# Patient Record
Sex: FEMALE | Race: WHITE | Employment: FULL TIME | URBAN - METROPOLITAN AREA
[De-identification: names, ages, dates, MRNs, and addresses within clinical notes are randomized per-mention and may not be internally consistent; named-entity substitution may affect disease eponyms.]

---

## 2021-07-10 ENCOUNTER — APPOINTMENT (OUTPATIENT)
Dept: GENERAL RADIOLOGY | Age: 56
DRG: 281 | End: 2021-07-10
Attending: EMERGENCY MEDICINE
Payer: COMMERCIAL

## 2021-07-10 ENCOUNTER — HOSPITAL ENCOUNTER (INPATIENT)
Age: 56
LOS: 3 days | Discharge: HOME | DRG: 281 | End: 2021-07-13
Attending: EMERGENCY MEDICINE | Admitting: INTERNAL MEDICINE
Payer: COMMERCIAL

## 2021-07-10 DIAGNOSIS — I21.4 NON-STEMI (NON-ST ELEVATED MYOCARDIAL INFARCTION) (HCC): ICD-10-CM

## 2021-07-10 DIAGNOSIS — R19.7 DIARRHEA, UNSPECIFIED TYPE: ICD-10-CM

## 2021-07-10 DIAGNOSIS — I21.4 NSTEMI (NON-ST ELEVATED MYOCARDIAL INFARCTION) (HCC): Primary | ICD-10-CM

## 2021-07-10 DIAGNOSIS — E87.1 HYPONATREMIA: ICD-10-CM

## 2021-07-10 LAB
ALBUMIN SERPL-MCNC: 3.9 G/DL (ref 3.5–5)
ALBUMIN/GLOB SERPL: 1.2 {RATIO} (ref 1.1–2.2)
ALP SERPL-CCNC: 50 U/L (ref 45–117)
ALT SERPL-CCNC: 20 U/L (ref 12–78)
ANION GAP SERPL CALC-SCNC: 7 MMOL/L (ref 5–15)
APPEARANCE UR: CLEAR
AST SERPL-CCNC: 19 U/L (ref 15–37)
BACTERIA URNS QL MICRO: NEGATIVE /HPF
BASOPHILS # BLD: 0 K/UL (ref 0–0.1)
BASOPHILS NFR BLD: 1 % (ref 0–1)
BILIRUB SERPL-MCNC: 0.3 MG/DL (ref 0.2–1)
BILIRUB UR QL: NEGATIVE
BUN SERPL-MCNC: 4 MG/DL (ref 6–20)
BUN/CREAT SERPL: 6 (ref 12–20)
CALCIUM SERPL-MCNC: 9.3 MG/DL (ref 8.5–10.1)
CHLORIDE SERPL-SCNC: 96 MMOL/L (ref 97–108)
CO2 SERPL-SCNC: 28 MMOL/L (ref 21–32)
COLOR UR: ABNORMAL
COVID-19 RAPID TEST, COVR: ABNORMAL
COVID-19 RAPID TEST, COVR: NOT DETECTED
CREAT SERPL-MCNC: 0.66 MG/DL (ref 0.55–1.02)
DIFFERENTIAL METHOD BLD: ABNORMAL
EOSINOPHIL # BLD: 0.1 K/UL (ref 0–0.4)
EOSINOPHIL NFR BLD: 2 % (ref 0–7)
EPITH CASTS URNS QL MICRO: ABNORMAL /LPF
ERYTHROCYTE [DISTWIDTH] IN BLOOD BY AUTOMATED COUNT: 11.6 % (ref 11.5–14.5)
GLOBULIN SER CALC-MCNC: 3.2 G/DL (ref 2–4)
GLUCOSE SERPL-MCNC: 92 MG/DL (ref 65–100)
GLUCOSE UR STRIP.AUTO-MCNC: NEGATIVE MG/DL
HCG UR QL: NEGATIVE
HCT VFR BLD AUTO: 34.8 % (ref 35–47)
HGB BLD-MCNC: 11.9 G/DL (ref 11.5–16)
HGB UR QL STRIP: NEGATIVE
HYALINE CASTS URNS QL MICRO: ABNORMAL /LPF (ref 0–5)
IMM GRANULOCYTES # BLD AUTO: 0 K/UL (ref 0–0.04)
IMM GRANULOCYTES NFR BLD AUTO: 0 % (ref 0–0.5)
KETONES UR QL STRIP.AUTO: ABNORMAL MG/DL
LEUKOCYTE ESTERASE UR QL STRIP.AUTO: NEGATIVE
LIPASE SERPL-CCNC: 67 U/L (ref 73–393)
LYMPHOCYTES # BLD: 0.7 K/UL (ref 0.8–3.5)
LYMPHOCYTES NFR BLD: 18 % (ref 12–49)
MCH RBC QN AUTO: 31.6 PG (ref 26–34)
MCHC RBC AUTO-ENTMCNC: 34.2 G/DL (ref 30–36.5)
MCV RBC AUTO: 92.6 FL (ref 80–99)
MONOCYTES # BLD: 0.8 K/UL (ref 0–1)
MONOCYTES NFR BLD: 21 % (ref 5–13)
NEUTS SEG # BLD: 2.4 K/UL (ref 1.8–8)
NEUTS SEG NFR BLD: 58 % (ref 32–75)
NITRITE UR QL STRIP.AUTO: NEGATIVE
NRBC # BLD: 0 K/UL (ref 0–0.01)
NRBC BLD-RTO: 0 PER 100 WBC
PH UR STRIP: 6 [PH] (ref 5–8)
PLATELET # BLD AUTO: 216 K/UL (ref 150–400)
PMV BLD AUTO: 9.6 FL (ref 8.9–12.9)
POTASSIUM SERPL-SCNC: 3.4 MMOL/L (ref 3.5–5.1)
PROT SERPL-MCNC: 7.1 G/DL (ref 6.4–8.2)
PROT UR STRIP-MCNC: NEGATIVE MG/DL
RBC # BLD AUTO: 3.76 M/UL (ref 3.8–5.2)
RBC #/AREA URNS HPF: ABNORMAL /HPF (ref 0–5)
RBC MORPH BLD: ABNORMAL
SODIUM SERPL-SCNC: 131 MMOL/L (ref 136–145)
SOURCE, COVRS: ABNORMAL
SOURCE, COVRS: NORMAL
SP GR UR REFRACTOMETRY: 1.01 (ref 1–1.03)
TROPONIN I SERPL-MCNC: 1.39 NG/ML
TROPONIN I SERPL-MCNC: 2.37 NG/ML
UR CULT HOLD, URHOLD: NORMAL
UROBILINOGEN UR QL STRIP.AUTO: 0.2 EU/DL (ref 0.2–1)
WBC # BLD AUTO: 4 K/UL (ref 3.6–11)
WBC URNS QL MICRO: ABNORMAL /HPF (ref 0–4)

## 2021-07-10 PROCEDURE — 74011000250 HC RX REV CODE- 250: Performed by: INTERNAL MEDICINE

## 2021-07-10 PROCEDURE — C9113 INJ PANTOPRAZOLE SODIUM, VIA: HCPCS | Performed by: INTERNAL MEDICINE

## 2021-07-10 PROCEDURE — 74011250636 HC RX REV CODE- 250/636: Performed by: PHYSICIAN ASSISTANT

## 2021-07-10 PROCEDURE — 96375 TX/PRO/DX INJ NEW DRUG ADDON: CPT

## 2021-07-10 PROCEDURE — 74011250637 HC RX REV CODE- 250/637: Performed by: INTERNAL MEDICINE

## 2021-07-10 PROCEDURE — 93005 ELECTROCARDIOGRAM TRACING: CPT

## 2021-07-10 PROCEDURE — 74011250637 HC RX REV CODE- 250/637: Performed by: NURSE PRACTITIONER

## 2021-07-10 PROCEDURE — 81001 URINALYSIS AUTO W/SCOPE: CPT

## 2021-07-10 PROCEDURE — 96361 HYDRATE IV INFUSION ADD-ON: CPT

## 2021-07-10 PROCEDURE — 65660000001 HC RM ICU INTERMED STEPDOWN

## 2021-07-10 PROCEDURE — 71045 X-RAY EXAM CHEST 1 VIEW: CPT

## 2021-07-10 PROCEDURE — 83690 ASSAY OF LIPASE: CPT

## 2021-07-10 PROCEDURE — 81025 URINE PREGNANCY TEST: CPT

## 2021-07-10 PROCEDURE — 74011250636 HC RX REV CODE- 250/636: Performed by: INTERNAL MEDICINE

## 2021-07-10 PROCEDURE — 96374 THER/PROPH/DIAG INJ IV PUSH: CPT

## 2021-07-10 PROCEDURE — 99285 EMERGENCY DEPT VISIT HI MDM: CPT

## 2021-07-10 PROCEDURE — 36415 COLL VENOUS BLD VENIPUNCTURE: CPT

## 2021-07-10 PROCEDURE — 74011250637 HC RX REV CODE- 250/637: Performed by: PHYSICIAN ASSISTANT

## 2021-07-10 PROCEDURE — 85025 COMPLETE CBC W/AUTO DIFF WBC: CPT

## 2021-07-10 PROCEDURE — 84484 ASSAY OF TROPONIN QUANT: CPT

## 2021-07-10 PROCEDURE — 87635 SARS-COV-2 COVID-19 AMP PRB: CPT

## 2021-07-10 PROCEDURE — 80053 COMPREHEN METABOLIC PANEL: CPT

## 2021-07-10 RX ORDER — ENOXAPARIN SODIUM 100 MG/ML
1 INJECTION SUBCUTANEOUS EVERY 12 HOURS
Status: DISCONTINUED | OUTPATIENT
Start: 2021-07-10 | End: 2021-07-13

## 2021-07-10 RX ORDER — METOPROLOL TARTRATE 25 MG/1
12.5 TABLET, FILM COATED ORAL 2 TIMES DAILY
Status: DISCONTINUED | OUTPATIENT
Start: 2021-07-10 | End: 2021-07-12

## 2021-07-10 RX ORDER — ACETAMINOPHEN 325 MG/1
650 TABLET ORAL
Status: DISCONTINUED | OUTPATIENT
Start: 2021-07-10 | End: 2021-07-13 | Stop reason: HOSPADM

## 2021-07-10 RX ORDER — OXYCODONE HYDROCHLORIDE 5 MG/1
5 TABLET ORAL
Status: DISCONTINUED | OUTPATIENT
Start: 2021-07-10 | End: 2021-07-13 | Stop reason: HOSPADM

## 2021-07-10 RX ORDER — SIMETHICONE 80 MG
80 TABLET,CHEWABLE ORAL
Status: DISCONTINUED | OUTPATIENT
Start: 2021-07-10 | End: 2021-07-13 | Stop reason: HOSPADM

## 2021-07-10 RX ORDER — MORPHINE SULFATE 2 MG/ML
2 INJECTION, SOLUTION INTRAMUSCULAR; INTRAVENOUS
Status: DISCONTINUED | OUTPATIENT
Start: 2021-07-10 | End: 2021-07-13 | Stop reason: HOSPADM

## 2021-07-10 RX ORDER — ONDANSETRON 2 MG/ML
4 INJECTION INTRAMUSCULAR; INTRAVENOUS
Status: DISCONTINUED | OUTPATIENT
Start: 2021-07-10 | End: 2021-07-13 | Stop reason: HOSPADM

## 2021-07-10 RX ORDER — GUAIFENESIN 100 MG/5ML
81 LIQUID (ML) ORAL DAILY
Status: DISCONTINUED | OUTPATIENT
Start: 2021-07-11 | End: 2021-07-13 | Stop reason: HOSPADM

## 2021-07-10 RX ORDER — LANOLIN ALCOHOL/MO/W.PET/CERES
3 CREAM (GRAM) TOPICAL
Status: DISCONTINUED | OUTPATIENT
Start: 2021-07-10 | End: 2021-07-13 | Stop reason: HOSPADM

## 2021-07-10 RX ORDER — GUAIFENESIN 100 MG/5ML
324 LIQUID (ML) ORAL
Status: COMPLETED | OUTPATIENT
Start: 2021-07-10 | End: 2021-07-10

## 2021-07-10 RX ORDER — SODIUM CHLORIDE 0.9 % (FLUSH) 0.9 %
5-40 SYRINGE (ML) INJECTION EVERY 8 HOURS
Status: DISCONTINUED | OUTPATIENT
Start: 2021-07-10 | End: 2021-07-13 | Stop reason: HOSPADM

## 2021-07-10 RX ORDER — KETOROLAC TROMETHAMINE 30 MG/ML
15 INJECTION, SOLUTION INTRAMUSCULAR; INTRAVENOUS
Status: COMPLETED | OUTPATIENT
Start: 2021-07-10 | End: 2021-07-10

## 2021-07-10 RX ORDER — HEPARIN SODIUM 5000 [USP'U]/ML
5000 INJECTION, SOLUTION INTRAVENOUS; SUBCUTANEOUS EVERY 8 HOURS
Status: DISCONTINUED | OUTPATIENT
Start: 2021-07-10 | End: 2021-07-10

## 2021-07-10 RX ORDER — ROSUVASTATIN CALCIUM 40 MG/1
40 TABLET, COATED ORAL
Status: DISCONTINUED | OUTPATIENT
Start: 2021-07-10 | End: 2021-07-12

## 2021-07-10 RX ORDER — ONDANSETRON 2 MG/ML
4 INJECTION INTRAMUSCULAR; INTRAVENOUS
Status: COMPLETED | OUTPATIENT
Start: 2021-07-10 | End: 2021-07-10

## 2021-07-10 RX ORDER — SODIUM CHLORIDE 9 MG/ML
125 INJECTION, SOLUTION INTRAVENOUS CONTINUOUS
Status: DISCONTINUED | OUTPATIENT
Start: 2021-07-10 | End: 2021-07-13

## 2021-07-10 RX ORDER — SODIUM CHLORIDE 0.9 % (FLUSH) 0.9 %
5-40 SYRINGE (ML) INJECTION AS NEEDED
Status: DISCONTINUED | OUTPATIENT
Start: 2021-07-10 | End: 2021-07-13 | Stop reason: HOSPADM

## 2021-07-10 RX ADMIN — SODIUM CHLORIDE 125 ML/HR: 9 INJECTION, SOLUTION INTRAVENOUS at 18:58

## 2021-07-10 RX ADMIN — NITROGLYCERIN 1 INCH: 20 OINTMENT TOPICAL at 19:04

## 2021-07-10 RX ADMIN — SIMETHICONE 80 MG: 80 TABLET, CHEWABLE ORAL at 23:43

## 2021-07-10 RX ADMIN — SODIUM CHLORIDE 125 ML/HR: 9 INJECTION, SOLUTION INTRAVENOUS at 23:42

## 2021-07-10 RX ADMIN — Medication 10 ML: at 19:07

## 2021-07-10 RX ADMIN — ALUMINUM HYDROXIDE, MAGNESIUM HYDROXIDE, AND SIMETHICONE 40 ML: 200; 200; 20 SUSPENSION ORAL at 19:05

## 2021-07-10 RX ADMIN — KETOROLAC TROMETHAMINE 15 MG: 30 INJECTION, SOLUTION INTRAMUSCULAR; INTRAVENOUS at 15:52

## 2021-07-10 RX ADMIN — POTASSIUM BICARBONATE 40 MEQ: 782 TABLET, EFFERVESCENT ORAL at 19:06

## 2021-07-10 RX ADMIN — SODIUM CHLORIDE 40 MG: 9 INJECTION INTRAMUSCULAR; INTRAVENOUS; SUBCUTANEOUS at 18:59

## 2021-07-10 RX ADMIN — ASPIRIN 324 MG: 81 TABLET, CHEWABLE ORAL at 17:15

## 2021-07-10 RX ADMIN — Medication 3 MG: at 23:43

## 2021-07-10 RX ADMIN — ONDANSETRON 4 MG: 2 INJECTION INTRAMUSCULAR; INTRAVENOUS at 15:51

## 2021-07-10 RX ADMIN — SODIUM CHLORIDE 1000 ML: 9 INJECTION, SOLUTION INTRAVENOUS at 15:52

## 2021-07-10 RX ADMIN — Medication 10 ML: at 23:44

## 2021-07-10 RX ADMIN — ENOXAPARIN SODIUM 70 MG: 80 INJECTION SUBCUTANEOUS at 20:53

## 2021-07-10 RX ADMIN — ROSUVASTATIN 40 MG: 40 TABLET, FILM COATED ORAL at 23:43

## 2021-07-10 RX ADMIN — HEPARIN SODIUM 5000 UNITS: 5000 INJECTION INTRAVENOUS; SUBCUTANEOUS at 19:01

## 2021-07-10 NOTE — Clinical Note
Right groin and right radial clipped prepped with ChloraPrep and draped. cashew allergy/food ingestion/known exposure (describe)

## 2021-07-10 NOTE — ED NOTES
Patient taken back to room 25 and placed on monitor times 3. Patient reports 3 days of diarrhea and chest pressure that started this morning and lasted about a an hour.  Patient describes the pain as if \"someone was sitting on my chest\"

## 2021-07-10 NOTE — ED NOTES
Bedside and Verbal shift change report given to Germain Sanchez and Dawn RUST 72. (oncoming nurse) by Nelly Up (offgoing nurse). Report included the following information SBAR, Kardex, ED Summary, [facility] and Recent Results.

## 2021-07-10 NOTE — H&P
Hospitalist History and Physical  Dayron Ravi MD  Answering service: [phone] from in house phone        Date of Service:  7/10/2021  NAME:  Chelsea Michel  :  1965  MRN:  [id number]  Primary Care Provider: None    Chief Complaint:   Chief Complaint   Patient presents with    Diarrhea    Chest Pain       History of Present Illness:     Chelsea Michel is a 49 y.o. female with no significant past medical history comes with complaints of chest pain and pressure along with diarrhea. Patient is awake, alert and oriented able to answer my question follow my request.  Data also obtained of the ED staff and extensive chart review. As per collective reports patient is a healthy lady who does not take any medications except for glucosamine and probiotics daily. Patient reports that she recently traveled back from [place] where she was a for vacation about a week ago and has been doing well but she developed diarrhea about 3 days ago. Diarrhea is nonbloody, nonmucoid, watery yellowish color diarrhea which happens in episodes and these episodes are associated with crampy abdominal pain. She also has lost appetite and has had poor oral intake in the last day and a half. Patient did not have any nausea or vomiting until this morning where she had nausea without any vomiting. She she also developed chest pains this morning. The pain she describes is as of chest pressure, starting since 10th a.m. along with slight dizziness and burning in the chest.  She feels as if her heartburn has gotten worse. She also reports that whenever she eats chest passes right through her but she only had had 24 BM this morning. She had a leftover [place] dinner on 2021. She also had fish tacos and lunch that day. She had no other sick contacts, no antibiotic use, no cough, fevers chills or rigors.   The chest pressure she described was as if somebody sitting on her chest and it lasted till 4 PM in the ED. In the ED she was normotensive, hemodynamically stable but was dizzy so received a fluid bolus of 1 L which improved her symptoms significantly. Blood work revealed hypokalemia, hyponatremia and elevated troponin at 1.39. EKG was negative for acute pathology. She received aspirin and cardiology was consulted. Cardiology recommended to get an echocardiogram and a Covid testing given diarrhea. We will admit the patient for further evaluation and management. Chart reviewed at length. Review of Systems:  Pertinent positives noted in HPI. All other systems were reviewed and are negative. Past Medical and surgical history:   Past Medical History:   Diagnosis Date    Cervical spondylosis with radiculopathy       Past Surgical History:   Procedure Laterality Date    HX GYN  2001    cervical conization        Home medications:  Patient takes glucosamine and probiotics at home    Allergies:  No Known Allergies    Family history:   History reviewed. No pertinent family history. SOCIAL HISTORY:  Patient resides at Home. Patient ambulates with out device. Smoking history: reports that she has been smoking. She has never used smokeless tobacco.  Drug History:  reports no history of drug use. Alcohol history:  reports current alcohol use.     Objective:       Physical Exam:   Visit Vitals  /84   Pulse 64   Temp 99 °F (37.2 °C)   Resp 15   Ht 5' 8.5\" (1.74 m)   Wt 68.9 kg (152 lb)   SpO2 100%   BMI 22.78 kg/m²     General appearance: alert, cooperative, no distress, appears stated age  Head: Normocephalic, without obvious abnormality, atraumatic  Eyes: negative findings: anicteric sclera  Throat: Oral mucosa is dry  Lungs: Clear to auscultation bilaterally, no wheezing rales or rhonchi  Heart: RRR, no murmur gallops or rubs  Abdomen: Soft, diffusely tender, with mild positive midepigastric tenderness, bowel sounds normoactive  Extremities: extremities normal, atraumatic, no cyanosis or edema  Pulses: 2+ and symmetric  Skin: Skin color, texture, turgor normal. No rashes or lesions  Neurologic: Grossly normal    ECG: Normal sinus rhythm    Laboratory and other diagnostic Data Review: All diagnostic labs and studies have been reviewed. No results found. Patient Vitals for the past 12 hrs:   Temp Pulse Resp BP SpO2   07/10/21 1708    122/84    07/10/21 1311 99 °F (37.2 °C) 64 15 113/73 100 %       Recent Labs     07/10/21  1542   WBC 4.0   HGB 11.9   HCT 34.8*        Recent Labs     07/10/21  1542   *   K 3.4*   CL 96*   CO2 28   BUN 4*   CREA 0.66   GLU 92   CA 9.3     Recent Labs     07/10/21  1542   ALT 20   AP 50   TBILI 0.3   TP 7.1   ALB 3.9   GLOB 3.2   LPSE 67*     No results for input(s): INR, PTP, APTT, INREXT in the last 72 hours. No results for input(s): FE, TIBC, PSAT, FERR in the last 72 hours. No results found for: FOL, RBCF   No results for input(s): PH, PCO2, PO2 in the last 72 hours.   Recent Labs     07/10/21  1542   TROIQ 1.39*     No results found for: CHOL, CHOLX, CHLST, CHOLV, HDL, HDLP, LDL, LDLC, DLDLP, TGLX, TRIGL, TRIGP, CHHD, CHHDX  No results found for: [facility]  Lab Results   Component Value Date/Time    Color YELLOW/STRAW 07/10/2021 03:42 PM    Appearance CLEAR 07/10/2021 03:42 PM    Specific gravity 1.007 07/10/2021 03:42 PM    pH (UA) 6.0 07/10/2021 03:42 PM    Protein Negative 07/10/2021 03:42 PM    Glucose Negative 07/10/2021 03:42 PM    Ketone TRACE (A) 07/10/2021 03:42 PM    Bilirubin Negative 07/10/2021 03:42 PM    Urobilinogen 0.2 07/10/2021 03:42 PM    Nitrites Negative 07/10/2021 03:42 PM    Leukocyte Esterase Negative 07/10/2021 03:42 PM    Epithelial cells FEW 07/10/2021 03:42 PM    Bacteria Negative 07/10/2021 03:42 PM    WBC 0-4 07/10/2021 03:42 PM    RBC 0-5 07/10/2021 03:42 PM       Assessment:   Given the patient's current clinical presentation, I have a high level of concern for decompensation if discharged from the emergency department. Complex decision making was performed, which includes reviewing the patient's available past medical records, laboratory results, and x-ray films. My assessment of this patient's clinical condition and my plan of care is as follows. Active Problems:    NSTEMI (non-ST elevated myocardial infarction) ([facility] [address] (7/10/2021)        Plan:     NSTEMI, POA  Admit to telemetry  Patient complains of chest pressure and retro-sternal burning (?  GERD ) with associated dizziness, without shortness of breath, palpitations, orthopnea or dysphoria  Troponin x1 at 1.39, received aspirin 325 mg p.o. x1  Continue aspirin  Holding off of metoprolol due to relative bradycardia  Check repeat troponin x1 now, if trending up, will place her on a heparin drippatient is in agreement  Continue to trend troponin every 6 as needed, EKG. Chest pain  EKG admission shows normal sinus rhythm  Cardiology consulted the ED, recommend echocardiogram and Covid test given diarrheawill await final consultation  IV fluids  N.p.o. at midnight  Nitro-Bid paste  GI cocktail and IV Protonix for upper GI symptoms  Patient is in agreement with this plan    Diarrhea, POA  The diarrhea is nonbloody, nonmucoid without any vomiting. Nausea is positive only today  Zofran IV as needed nausea  IV fluids  Cover testing is being done, if diarrhea persists or if she has any signs symptoms of infection, will recommend getting a stool for ova and parasites and stool culture. Hyponatremia and hypokalemia due to GI losses.   Replete and repeat lab in the morning  IV fluids      Diet: Regular Diet  Activity: Out of bed to chair, fall precautions  DVT prophylaxis: Subcu heparin  Isolation precautions: None  Consultations: Cardiology  Anticipated disposition: Home with family  Code status: Full Code    Admit to inpatient status      Patient was explained about the risk of admission including and not a complete list including risk of falls,fractures,blood clots,allergic reactions,infections. Patient/family also understands and agrees to the treatment plan including medications and side effect profiles and also understand the risk with radiation while undergoing imaging studies. The patient and the family/friends (after permission given by the patient to discuss) understand this and agree with the admission plan. Signed By: Cruz Cruz MD     07/10/21  6:08 PM        Patient's emergency contacts:  Extended Emergency Contact Information  Primary Emergency Contact: [address] Phone: [phone]  Relation: Mother     Please note that this dictation was completed with [employer], the [employer] voice recognition software. Quite often unanticipated grammatical, syntax, homophones, and other interpretive errors are inadvertently transcribed by the computer software. Please disregard these errors. Please excuse any errors that have escaped final proofreading.

## 2021-07-10 NOTE — ED PROVIDER NOTES
61-year-old female with no significant past medical history presents ambulatory for evaluation of abdominal cramping and diarrhea for the past 3 days. She states since 10am today she has felt lightheaded and pressure in her chest improving on arrival.  She states she was having 6-8 yellow bowel movements a day, states \"whenever I eat anything it goes right through me. \"  She states today she has only had 4 bowel movement but admits to decreased appetite. She states she had an episode of diarrhea that lasted 48 hours in early June and resolved spontaneously and has not had recurrence until 7/7. She states she ate a fish taco for lunch the day before and had leftover [place] dinner that night. She denies antibiotic use in the past 2 months, denies sick contacts. No sick contacts, no recent travel. States at 10am she had chest pressure that was non-radiating, \"felt like someone was sitting on my chest\" that lasted from 10am until arrival to the ER in the waiting room. At time of exam CP is minimal.   Saw cardiologist 10 years ago for palpitations with holter monitor placed and no findings at that time and it resolved. Surgical history-no abdominal surgeries  Social history- drinks alcohol 2x a week, smokes 2x a week; was on vacation 1.5 weeks ao and drank and smoked daily           Past Medical History:   Diagnosis Date    Cervical spondylosis with radiculopathy        Past Surgical History:   Procedure Laterality Date    HX GYN  2001    cervical conization          History reviewed. No pertinent family history. Social History     Socioeconomic History    Marital status:      Spouse name: Not on file    Number of children: Not on file    Years of education: Not on file    Highest education level: Not on file   Occupational History    Not on file   Tobacco Use    Smoking status: Current Some Day Smoker    Smokeless tobacco: Never Used   Substance and Sexual Activity    Alcohol use:  Yes    Drug use: Never    Sexual activity: Not on file   Other Topics Concern    Not on file   Social History Narrative    Not on file     Social Determinants of Health     Financial Resource Strain:     Difficulty of Paying Living Expenses:    Food Insecurity:     Worried About Running Out of Food in the Last Year:     [address] in the Last Year:    Transportation Needs:     Lack of Transportation (Medical):  Lack of Transportation (Non-Medical):    Physical Activity:     Days of Exercise per Week:     Minutes of Exercise per Session:    Stress:     Feeling of Stress :    Social Connections:     Frequency of Communication with Friends and Family:     Frequency of Social Gatherings with Friends and Family:     Attends Faith Services:     Active Member of Clubs or Organizations:     Attends Club or Organization Meetings:     Marital Status:    Intimate Partner Violence:     Fear of Current or Ex-Partner:     Emotionally Abused:     Physically Abused:     Sexually Abused: ALLERGIES: Patient has no known allergies. Review of Systems   Constitutional: Positive for appetite change. Negative for activity change, chills, fatigue and unexpected weight change. HENT: Negative for trouble swallowing. Respiratory: Negative for cough, chest tightness, shortness of breath and wheezing. Cardiovascular: Negative. Negative for palpitations. Chest pressure   Gastrointestinal: Positive for abdominal pain, diarrhea and nausea. Negative for vomiting. Genitourinary: Negative. Negative for dysuria, flank pain, frequency and hematuria. Musculoskeletal: Negative. Negative for arthralgias, back pain, neck pain and neck stiffness. Skin: Negative. Negative for color change and rash. Neurological: Negative. Negative for dizziness, numbness and headaches. All other systems reviewed and are negative.       Vitals:    07/10/21 1311   BP: 113/73   Pulse: 64   Resp: 15   Temp: 99 °F (37.2 °C)   SpO2: 100%   Weight: 68.9 kg (152 lb)   Height: 5' 8.5\" (1.74 m)            Physical Exam  Vitals and nursing note reviewed. Constitutional:       General: She is not in acute distress. Appearance: She is well-developed. She is not toxic-appearing or diaphoretic. Comments: WF   HENT:      Head: Normocephalic and atraumatic. Eyes:      General:         Right eye: No discharge. Left eye: No discharge. Conjunctiva/sclera: Conjunctivae normal.      Pupils: Pupils are equal, round, and reactive to light. Neck:      Trachea: No tracheal tenderness. Cardiovascular:      Rate and Rhythm: Normal rate and regular rhythm. Pulses: Normal pulses. Heart sounds: Normal heart sounds. No murmur heard. No friction rub. No gallop. Pulmonary:      Effort: Pulmonary effort is normal. No respiratory distress. Breath sounds: Normal breath sounds. No wheezing or rales. Chest:      Chest wall: No tenderness. Abdominal:      General: Bowel sounds are normal. There is no distension. Palpations: Abdomen is soft. Tenderness: There is no abdominal tenderness. There is no guarding or rebound. Comments: Mild generalized discomfort; no peritoneal signs. Musculoskeletal:         General: No tenderness. Normal range of motion. Cervical back: Full passive range of motion without pain and normal range of motion. Skin:     General: Skin is warm and dry. Capillary Refill: Capillary refill takes less than 2 seconds. Findings: No abrasion, erythema or rash. Neurological:      Mental Status: She is alert and oriented to person, place, and time. Cranial Nerves: No cranial nerve deficit. Sensory: No sensory deficit.       Coordination: Coordination normal.   Psychiatric:         Speech: Speech normal.         Behavior: Behavior normal.          MDM  Number of Diagnoses or Management Options  Diagnosis management comments:   Ddx: Gastroenteritis, food poisoning, colitis, dehydration, ACS, electrolyte abnormality, UTI, pregnancy       Amount and/or Complexity of Data Reviewed  Clinical lab tests: ordered and reviewed  Tests in the radiology section of CPT®: ordered and reviewed  Review and summarize past medical records: yes    Patient Progress  Patient progress: stable    ED Course as of Jul 10 1710   Sat Jul 10, 2021   1626 ED EKG interpretation:  Rhythm: normal sinus rhythm; and regular . Rate (approx.): 62; Axis: normal; ST/T wave: normal; No evidence of acute coronary ischemia. [AL]      ED Course User Index  [AL] Yun Mcnamara MD       Procedures    I discussed patient's PMH, exam findings as well as careplan with the ER attending who agrees with care plan. Kimmie Momin PA-C    EKG interpretation:   Rhythm: normal sinus rhythm; and regular . Rate (approx.): 62; Axis: normal; P wave: normal; QRS interval: normal ; ST/T wave: normal;       Perfect Serve Consult for Admission  5:16 PM    ED Room Number: ER25/25  Patient Name and age:  Tamiko Hines 49 y.o.  female  Working Diagnosis:   1. NSTEMI (non-ST elevated myocardial infarction) ([facility] [address]    2. Hyponatremia    3. Diarrhea, unspecified type        COVID-19 Suspicion:  no  Sepsis present:  no  Reassessment needed: no  Code Status:  Full Code  Readmission: no  Isolation Requirements:  no  Recommended Level of Care:  telemetry  Department:[facility] Adult ED - 21   Other:  No PMH. + smoker and 2x/week drinker. Diarrhea x 3 days. Chest pressure, nausea x 10am until ER arrival, resolved in waiting area. EKG shows NSR. Troponin 1.39. Creat normal at 0.66. No Na and Cl. Fluid bolus, Toradol initially given. ASA ordered when Troponin resulted. Has been pain-free since ~4pm.      CONSULT NOTE:   5:40 PM  Kimmie Momin PA-C spoke with Dr. Carlene Lopez,   Specialty: cardiology  Discussed pt's hx, disposition, and available diagnostic and imaging results. Reviewed care plans.  Consultant agrees with plans as outlined. Dr. Gregg Mcallister recommends COVID test due to diarrhea symptoms with chest pain and asks if you can order an echo for the morning. He said he will look at her chart but had no further recommendations at this time. .   Written by Danny Boss PA-C.    5:41pm  I notified hospitalist of cardiology recommendations.   Danny Boss PA-C

## 2021-07-10 NOTE — PROGRESS NOTES
Chart reviewed, severe diarrhea some chest discomfort, reflux, loss of appetite, EKG normal, mild troponin elevation,echo ordered.

## 2021-07-11 ENCOUNTER — APPOINTMENT (OUTPATIENT)
Dept: ULTRASOUND IMAGING | Age: 56
DRG: 281 | End: 2021-07-11
Attending: INTERNAL MEDICINE
Payer: COMMERCIAL

## 2021-07-11 ENCOUNTER — APPOINTMENT (OUTPATIENT)
Dept: CT IMAGING | Age: 56
DRG: 281 | End: 2021-07-11
Attending: INTERNAL MEDICINE
Payer: COMMERCIAL

## 2021-07-11 ENCOUNTER — APPOINTMENT (OUTPATIENT)
Dept: VASCULAR SURGERY | Age: 56
DRG: 281 | End: 2021-07-11
Attending: INTERNAL MEDICINE
Payer: COMMERCIAL

## 2021-07-11 ENCOUNTER — APPOINTMENT (OUTPATIENT)
Dept: NON INVASIVE DIAGNOSTICS | Age: 56
DRG: 281 | End: 2021-07-11
Attending: SPECIALIST
Payer: COMMERCIAL

## 2021-07-11 PROBLEM — R19.7 DIARRHEA OF PRESUMED INFECTIOUS ORIGIN: Status: ACTIVE | Noted: 2021-07-11

## 2021-07-11 LAB
ALBUMIN SERPL-MCNC: 2.8 G/DL (ref 3.5–5)
ALBUMIN/GLOB SERPL: 1.1 {RATIO} (ref 1.1–2.2)
ALP SERPL-CCNC: 36 U/L (ref 45–117)
ALT SERPL-CCNC: 17 U/L (ref 12–78)
AMPHET UR QL SCN: NEGATIVE
ANION GAP SERPL CALC-SCNC: 4 MMOL/L (ref 5–15)
AST SERPL-CCNC: 21 U/L (ref 15–37)
AV R PG: 23.35 MMHG
BARBITURATES UR QL SCN: NEGATIVE
BASOPHILS # BLD: 0 K/UL (ref 0–0.1)
BASOPHILS NFR BLD: 1 % (ref 0–1)
BENZODIAZ UR QL: NEGATIVE
BILIRUB SERPL-MCNC: 0.2 MG/DL (ref 0.2–1)
BUN SERPL-MCNC: 5 MG/DL (ref 6–20)
BUN/CREAT SERPL: 10 (ref 12–20)
CALCIUM SERPL-MCNC: 7.5 MG/DL (ref 8.5–10.1)
CANNABINOIDS UR QL SCN: NEGATIVE
CHLORIDE SERPL-SCNC: 103 MMOL/L (ref 97–108)
CHOLEST SERPL-MCNC: 121 MG/DL
CO2 SERPL-SCNC: 26 MMOL/L (ref 21–32)
COCAINE UR QL SCN: NEGATIVE
COMMENT, HOLDF: NORMAL
CREAT SERPL-MCNC: 0.52 MG/DL (ref 0.55–1.02)
CREAT UR-MCNC: <13 MG/DL
CREAT UR-MCNC: <13 MG/DL
D DIMER PPP FEU-MCNC: 0.36 MG/L FEU (ref 0–0.65)
DIFFERENTIAL METHOD BLD: ABNORMAL
DRUG SCRN COMMENT,DRGCM: NORMAL
ECHO AO ROOT DIAM: 2.97 CM
ECHO AR MAX VEL PISA: 241.61 CM/S
ECHO AV PEAK GRADIENT: 8.01 MMHG
ECHO AV PEAK VELOCITY: 141.5 CM/S
ECHO AV REGURGITANT PHT: 5479.9 MS
ECHO EST RA PRESSURE: 3 MMHG
ECHO LA AREA 4C: 13.63 CM2
ECHO LA MAJOR AXIS: 3.95 CM
ECHO LA MINOR AXIS: 2.12 CM
ECHO LA VOL 2C: 51.77 ML (ref 22–52)
ECHO LA VOL 4C: 31.25 ML (ref 22–52)
ECHO LA VOL BP: 45.56 ML (ref 22–52)
ECHO LA VOL/BSA BIPLANE: 24.49 ML/M2 (ref 16–28)
ECHO LA VOLUME INDEX A2C: 27.83 ML/M2 (ref 16–28)
ECHO LA VOLUME INDEX A4C: 16.8 ML/M2 (ref 16–28)
ECHO LV E' LATERAL VELOCITY: 12.16 CM/S
ECHO LV E' SEPTAL VELOCITY: 8.49 CM/S
ECHO LV EDV A2C: 80.39 ML
ECHO LV EDV A4C: 84.46 ML
ECHO LV EDV BP: 82.64 ML (ref 56–104)
ECHO LV EDV INDEX A4C: 45.4 ML/M2
ECHO LV EDV INDEX BP: 44.4 ML/M2
ECHO LV EDV NDEX A2C: 43.2 ML/M2
ECHO LV EJECTION FRACTION A2C: 67 PERCENT
ECHO LV EJECTION FRACTION A4C: 66 PERCENT
ECHO LV EJECTION FRACTION BIPLANE: 66.1 PERCENT (ref 55–100)
ECHO LV ESV A2C: 26.37 ML
ECHO LV ESV A4C: 28.78 ML
ECHO LV ESV BP: 27.98 ML (ref 19–49)
ECHO LV ESV INDEX A2C: 14.2 ML/M2
ECHO LV ESV INDEX A4C: 15.5 ML/M2
ECHO LV ESV INDEX BP: 15 ML/M2
ECHO LV INTERNAL DIMENSION DIASTOLIC: 5.17 CM (ref 3.9–5.3)
ECHO LV INTERNAL DIMENSION SYSTOLIC: 3.32 CM
ECHO LV IVSD: 0.83 CM (ref 0.6–0.9)
ECHO LV MASS 2D: 156.6 G (ref 67–162)
ECHO LV MASS INDEX 2D: 84.2 G/M2 (ref 43–95)
ECHO LV POSTERIOR WALL DIASTOLIC: 0.88 CM (ref 0.6–0.9)
ECHO LVOT DIAM: 1.9 CM
ECHO MV A VELOCITY: 62.33 CM/S
ECHO MV AREA PHT: 3.92 CM2
ECHO MV E DECELERATION TIME (DT): 193.37 MS
ECHO MV E VELOCITY: 92.78 CM/S
ECHO MV E/A RATIO: 1.49
ECHO MV E/E' LATERAL: 7.63
ECHO MV E/E' RATIO (AVERAGED): 9.28
ECHO MV E/E' SEPTAL: 10.93
ECHO MV PRESSURE HALF TIME (PHT): 56.08 MS
ECHO PV MAX VELOCITY: 80.71 CM/S
ECHO PV PEAK INSTANTANEOUS GRADIENT SYSTOLIC: 2.61 MMHG
ECHO PV REGURGITANT MAX VELOCITY: 97.16 CM/S
ECHO RIGHT VENTRICULAR SYSTOLIC PRESSURE (RVSP): 22.86 MMHG
ECHO RV INTERNAL DIMENSION: 3.27 CM
ECHO RV TAPSE: 2.23 CM (ref 1.5–2)
ECHO TV REGURGITANT MAX VELOCITY: 162.65 CM/S
ECHO TV REGURGITANT MAX VELOCITY: 222.81 CM/S
ECHO TV REGURGITANT PEAK GRADIENT: 19.86 MMHG
EOSINOPHIL # BLD: 0.1 K/UL (ref 0–0.4)
EOSINOPHIL NFR BLD: 2 % (ref 0–7)
ERYTHROCYTE [DISTWIDTH] IN BLOOD BY AUTOMATED COUNT: 11.3 % (ref 11.5–14.5)
ERYTHROCYTE [DISTWIDTH] IN BLOOD BY AUTOMATED COUNT: 11.5 % (ref 11.5–14.5)
GLOBULIN SER CALC-MCNC: 2.5 G/DL (ref 2–4)
GLUCOSE SERPL-MCNC: 104 MG/DL (ref 65–100)
HCT VFR BLD AUTO: 26.5 % (ref 35–47)
HCT VFR BLD AUTO: 30.4 % (ref 35–47)
HDLC SERPL-MCNC: 42 MG/DL
HDLC SERPL: 2.9 {RATIO} (ref 0–5)
HGB BLD-MCNC: 10.3 G/DL (ref 11.5–16)
HGB BLD-MCNC: 9 G/DL (ref 11.5–16)
IMM GRANULOCYTES # BLD AUTO: 0 K/UL (ref 0–0.04)
IMM GRANULOCYTES NFR BLD AUTO: 0 % (ref 0–0.5)
LDLC SERPL CALC-MCNC: 60.6 MG/DL (ref 0–100)
LYMPHOCYTES # BLD: 0.9 K/UL (ref 0.8–3.5)
LYMPHOCYTES NFR BLD: 30 % (ref 12–49)
MAGNESIUM SERPL-MCNC: 1.7 MG/DL (ref 1.6–2.4)
MCH RBC QN AUTO: 31.1 PG (ref 26–34)
MCH RBC QN AUTO: 31.6 PG (ref 26–34)
MCHC RBC AUTO-ENTMCNC: 33.9 G/DL (ref 30–36.5)
MCHC RBC AUTO-ENTMCNC: 34 G/DL (ref 30–36.5)
MCV RBC AUTO: 91.7 FL (ref 80–99)
MCV RBC AUTO: 93.3 FL (ref 80–99)
METHADONE UR QL: NEGATIVE
MICROALBUMIN UR-MCNC: <0.5 MG/DL
MICROALBUMIN/CREAT UR-RTO: NORMAL MG/G (ref 0–30)
MONOCYTES # BLD: 0.6 K/UL (ref 0–1)
MONOCYTES NFR BLD: 22 % (ref 5–13)
NEUTS SEG # BLD: 1.3 K/UL (ref 1.8–8)
NEUTS SEG NFR BLD: 45 % (ref 32–75)
NRBC # BLD: 0 K/UL (ref 0–0.01)
NRBC # BLD: 0 K/UL (ref 0–0.01)
NRBC BLD-RTO: 0 PER 100 WBC
NRBC BLD-RTO: 0 PER 100 WBC
OPIATES UR QL: NEGATIVE
OSMOLALITY UR: 119 MOSM/KG H2O
PCP UR QL: NEGATIVE
PLATELET # BLD AUTO: 150 K/UL (ref 150–400)
PLATELET # BLD AUTO: 167 K/UL (ref 150–400)
PMV BLD AUTO: 10 FL (ref 8.9–12.9)
PMV BLD AUTO: 10.1 FL (ref 8.9–12.9)
POTASSIUM SERPL-SCNC: 3.1 MMOL/L (ref 3.5–5.1)
POTASSIUM UR-SCNC: 2 MMOL/L
PROT SERPL-MCNC: 5.3 G/DL (ref 6.4–8.2)
RBC # BLD AUTO: 2.89 M/UL (ref 3.8–5.2)
RBC # BLD AUTO: 3.26 M/UL (ref 3.8–5.2)
RBC MORPH BLD: ABNORMAL
SAMPLES BEING HELD,HOLD: NORMAL
SODIUM SERPL-SCNC: 133 MMOL/L (ref 136–145)
SODIUM UR-SCNC: 25 MMOL/L
TRIGL SERPL-MCNC: 92 MG/DL (ref ?–150)
TROPONIN I SERPL-MCNC: 2.69 NG/ML
TROPONIN I SERPL-MCNC: 6.1 NG/ML
VLDLC SERPL CALC-MCNC: 18.4 MG/DL
WBC # BLD AUTO: 2.5 K/UL (ref 3.6–11)
WBC # BLD AUTO: 2.9 K/UL (ref 3.6–11)

## 2021-07-11 PROCEDURE — 80053 COMPREHEN METABOLIC PANEL: CPT

## 2021-07-11 PROCEDURE — 80061 LIPID PANEL: CPT

## 2021-07-11 PROCEDURE — 85025 COMPLETE CBC W/AUTO DIFF WBC: CPT

## 2021-07-11 PROCEDURE — 71275 CT ANGIOGRAPHY CHEST: CPT

## 2021-07-11 PROCEDURE — 82570 ASSAY OF URINE CREATININE: CPT

## 2021-07-11 PROCEDURE — 74011250636 HC RX REV CODE- 250/636: Performed by: INTERNAL MEDICINE

## 2021-07-11 PROCEDURE — 85027 COMPLETE CBC AUTOMATED: CPT

## 2021-07-11 PROCEDURE — 84484 ASSAY OF TROPONIN QUANT: CPT

## 2021-07-11 PROCEDURE — 85379 FIBRIN DEGRADATION QUANT: CPT

## 2021-07-11 PROCEDURE — 93005 ELECTROCARDIOGRAM TRACING: CPT

## 2021-07-11 PROCEDURE — 93970 EXTREMITY STUDY: CPT

## 2021-07-11 PROCEDURE — 74011250637 HC RX REV CODE- 250/637: Performed by: INTERNAL MEDICINE

## 2021-07-11 PROCEDURE — 80307 DRUG TEST PRSMV CHEM ANLYZR: CPT

## 2021-07-11 PROCEDURE — 93306 TTE W/DOPPLER COMPLETE: CPT | Performed by: SPECIALIST

## 2021-07-11 PROCEDURE — 83735 ASSAY OF MAGNESIUM: CPT

## 2021-07-11 PROCEDURE — C9113 INJ PANTOPRAZOLE SODIUM, VIA: HCPCS | Performed by: INTERNAL MEDICINE

## 2021-07-11 PROCEDURE — 84300 ASSAY OF URINE SODIUM: CPT

## 2021-07-11 PROCEDURE — 74177 CT ABD & PELVIS W/CONTRAST: CPT

## 2021-07-11 PROCEDURE — 93306 TTE W/DOPPLER COMPLETE: CPT

## 2021-07-11 PROCEDURE — 99255 IP/OBS CONSLTJ NEW/EST HI 80: CPT | Performed by: SPECIALIST

## 2021-07-11 PROCEDURE — 74011000636 HC RX REV CODE- 636: Performed by: RADIOLOGY

## 2021-07-11 PROCEDURE — 84133 ASSAY OF URINE POTASSIUM: CPT

## 2021-07-11 PROCEDURE — 0100U RESPIRATORY PANEL,PCR,NASOPHARYNGEAL: CPT

## 2021-07-11 PROCEDURE — 74011000250 HC RX REV CODE- 250: Performed by: INTERNAL MEDICINE

## 2021-07-11 PROCEDURE — 65660000000 HC RM CCU STEPDOWN

## 2021-07-11 PROCEDURE — 36415 COLL VENOUS BLD VENIPUNCTURE: CPT

## 2021-07-11 PROCEDURE — 83935 ASSAY OF URINE OSMOLALITY: CPT

## 2021-07-11 PROCEDURE — 76705 ECHO EXAM OF ABDOMEN: CPT

## 2021-07-11 RX ADMIN — MORPHINE SULFATE 2 MG: 2 INJECTION, SOLUTION INTRAMUSCULAR; INTRAVENOUS at 22:59

## 2021-07-11 RX ADMIN — ENOXAPARIN SODIUM 70 MG: 80 INJECTION SUBCUTANEOUS at 20:51

## 2021-07-11 RX ADMIN — SODIUM CHLORIDE 40 MG: 9 INJECTION INTRAMUSCULAR; INTRAVENOUS; SUBCUTANEOUS at 09:32

## 2021-07-11 RX ADMIN — ENOXAPARIN SODIUM 70 MG: 80 INJECTION SUBCUTANEOUS at 07:10

## 2021-07-11 RX ADMIN — ONDANSETRON 4 MG: 2 INJECTION INTRAMUSCULAR; INTRAVENOUS at 18:07

## 2021-07-11 RX ADMIN — ONDANSETRON 4 MG: 2 INJECTION INTRAMUSCULAR; INTRAVENOUS at 03:26

## 2021-07-11 RX ADMIN — SODIUM CHLORIDE 125 ML/HR: 9 INJECTION, SOLUTION INTRAVENOUS at 09:35

## 2021-07-11 RX ADMIN — Medication 10 ML: at 06:00

## 2021-07-11 RX ADMIN — MORPHINE SULFATE 2 MG: 2 INJECTION, SOLUTION INTRAMUSCULAR; INTRAVENOUS at 05:24

## 2021-07-11 RX ADMIN — OXYCODONE 5 MG: 5 TABLET ORAL at 03:18

## 2021-07-11 RX ADMIN — IOPAMIDOL 100 ML: 755 INJECTION, SOLUTION INTRAVENOUS at 14:39

## 2021-07-11 RX ADMIN — Medication 10 ML: at 20:51

## 2021-07-11 RX ADMIN — MORPHINE SULFATE 2 MG: 2 INJECTION, SOLUTION INTRAMUSCULAR; INTRAVENOUS at 18:36

## 2021-07-11 RX ADMIN — ASPIRIN 81 MG: 81 TABLET, CHEWABLE ORAL at 09:31

## 2021-07-11 RX ADMIN — ROSUVASTATIN 40 MG: 40 TABLET, FILM COATED ORAL at 20:50

## 2021-07-11 NOTE — PROGRESS NOTES
2300  TRANSFER - IN REPORT:    Verbal report received from Esther(name) on Adis Jimenez  being received from ED(unit) for routine progression of care      Report consisted of patients Situation, Background, Assessment and   Recommendations(SBAR). Information from the following report(s) Intake/Output, MAR, Accordion, Recent Results and Cardiac Rhythm NSR was reviewed with the receiving nurse. Opportunity for questions and clarification was provided. Assessment completed upon patients arrival to unit and care assumed. 0240  Pt called me to tell her about new onset of symptoms. She is complaining of pain running from the RT side of her face running down her LT arm. She explains that it comes in waves. She is also having headaches and is incredibly thirsty. 0730  Bedside shift change report given to Stiven Nascimento (oncoming nurse) by Daphne Fitzpatrick (offgoing nurse). Report included the following information SBAR, Kardex, Intake/Output, MAR, Accordion, Recent Results and Cardiac Rhythm NSR.

## 2021-07-11 NOTE — PROGRESS NOTES
1438: Notified Dr. Arnie Wagner of increase of troponin from 2.69 to 6. 10. MD verbalized understanding. No new orders received at this time.

## 2021-07-11 NOTE — ROUTINE PROCESS
.. TRANSFER - OUT REPORT:    Verbal report given to Thalia Stallings RN(name) on Shreyas Baumann  being transferred to [facility](unit) for routine progression of care       Report consisted of patients Situation, Background, Assessment and   Recommendations(SBAR). Information from the following report(s) SBAR, ED Summary, Recent Results and Cardiac Rhythm nsr was reviewed with the receiving nurse. Lines:   Peripheral IV 07/10/21 Left Antecubital (Active)   Site Assessment Clean, dry, & intact 07/10/21 1543   Phlebitis Assessment 0 07/10/21 1543   Infiltration Assessment 0 07/10/21 1543   Dressing Status Clean, dry, & intact 07/10/21 1543   Dressing Type Transparent 07/10/21 1543        Opportunity for questions and clarification was provided.       Patient transported with:   Registered Nurse

## 2021-07-11 NOTE — CONSULTS
Consult Note      Assessment:    Patient Active Problem List   Diagnosis Code    [facility]ST elevated [facility]) [facility] I[facility]    Diarrhea of presumed infectious origin R19.7       Recommendations:    stress test tomorrow. Her CBC is remarkably abnormal and should be repeated. Her recenl symptoms seems viral, chest discomfort made it hard for her to take a deep breath. Despite elevated troponin her EKG and LV function are normal.  Of note her PA seems dilated on echo with almost moderate pulmonic regurgitation, would consider CTA of chest.    She had a previous milder episode similar to this, both occurring after her second Covid shot on 6/8. Of note she does exercise on a regular basis, does not smoke, no DM. Tim Harkins MD  [phone]  Elisha Alan is a 49 y.o. female who presented 7/10/2021 with complaints of chest pain and severe diarrhea. The symptoms began approximately 3 days ago. She has no history of cardiac disease including CAD and atrial fib. Examination by the attending physician suggested the possibility of CAD since troponin mildly elevated. At present the patient has significantly improved since initial presentation. Therapy thus far has included pain medications. Cardiology has been consulted to assist in the management of this patient.     Current Facility-Administered Medications   Medication Dose Route Frequency    sodium chloride (NS) flush 5-40 mL  5-40 mL IntraVENous Q8H    sodium chloride (NS) flush 5-40 mL  5-40 mL IntraVENous PRN    nitroglycerin (NITROBID) 2 % ointment 1 Inch  1 Inch Topical Q6H    [Held by provider] metoprolol tartrate (LOPRESSOR) tablet 12.5 mg  12.5 mg Oral BID    aspirin chewable tablet 81 mg  81 mg Oral DAILY    rosuvastatin (CRESTOR) tablet 40 mg  40 mg Oral QHS    acetaminophen (TYLENOL) tablet 650 mg  650 mg Oral Q4H PRN    oxyCODONE IR (ROXICODONE) tablet 5 mg  5 mg Oral Q4H PRN    morphine injection 2 mg  2 mg IntraVENous Q4H PRN    pantoprazole (PROTONIX) 40 mg in 0.9% sodium chloride 10 mL injection  40 mg IntraVENous DAILY    0.9% sodium chloride infusion  125 mL/hr IntraVENous CONTINUOUS    ondansetron (ZOFRAN) injection 4 mg  4 mg IntraVENous Q8H PRN    enoxaparin (LOVENOX) injection 70 mg  1 mg/kg SubCUTAneous Q12H    melatonin tablet 3 mg  3 mg Oral QHS PRN    simethicone (MYLICON) tablet 80 mg  80 mg Oral QID PRN     Past Medical History:   Diagnosis Date    Cervical spondylosis with radiculopathy      Patient Active Problem List   Diagnosis Code    [facility]ST [facility]    Diarrhea of presumed infectious origin R19.7     No Known Allergies  Social History     Tobacco Use    Smoking status: Current Some Day Smoker    Smokeless tobacco: Never Used   Substance Use Topics    Alcohol use: Yes    Drug use: Never     History reviewed. No pertinent family history. Review of Symptoms:  A comprehensive review of systems was negative except for that written in the HPI. Objective:      Visit Vitals  /66   Pulse (!) 58   Temp 98.6 °F (37 °C)   Resp 16   Ht 5' 8\" (1.727 m)   Wt 160 lb 15 oz (73 kg)   SpO2 99%   BMI 24.47 kg/m²      Physical Exam    Visit Vitals  /66   Pulse (!) 58   Temp 98.6 °F (37 °C)   Resp 16   Ht 5' 8\" (1.727 m)   Wt 160 lb 15 oz (73 kg)   SpO2 99%   BMI 24.47 kg/m²     General Appearance:  Well developed, well nourished,alert and oriented x 3,  individual in no acute distress. Ears/Nose/Mouth/Throat:   Hearing grossly normal.         Neck: Supple. Chest:   Lungs clear to auscultation bilaterally. Cardiovascular:  Regular rate and rhythm, S1, S2 normal, no murmur. Abdomen:   Soft, non-tender, bowel sounds are active. Extremities: No edema bilaterally. Skin: Warm and dry.                Cardiographics    Telemetry: normal sinus rhythm  ECG: normal EKG, normal sinus rhythm  Echocardiogram: Abnormal, and reviewed by myself: as above    Labs:   Recent Results (from the past 24 hour(s))   EKG, 12 LEAD, INITIAL    Collection Time: 07/10/21  3:39 PM   Result Value Ref Range    Ventricular Rate 62 BPM    Atrial Rate 62 BPM    P-R Interval 144 ms    QRS Duration 90 ms    Q-T Interval 382 ms    QTC Calculation (Bezet) 387 ms    Calculated P Axis 58 degrees    Calculated R Axis 12 degrees    Calculated T Axis 19 degrees    Diagnosis Normal sinus rhythm  No previous ECGs available      CBC WITH AUTOMATED DIFF    Collection Time: 07/10/21  3:42 PM   Result Value Ref Range    WBC 4.0 3.6 - 11.0 K/uL    RBC 3.76 (L) 3.80 - 5.20 M/uL    HGB 11.9 11.5 - 16.0 g/dL    HCT 34.8 (L) 35.0 - 47.0 %    MCV 92.6 80.0 - 99.0 FL    MCH 31.6 26.0 - 34.0 PG    MCHC 34.2 30.0 - 36.5 g/dL    RDW 11.6 11.5 - 14.5 %    PLATELET 870 703 - 027 K/uL    MPV 9.6 8.9 - 12.9 FL    NRBC 0.0 0  WBC    ABSOLUTE NRBC 0.00 0.00 - 0.01 K/uL    NEUTROPHILS 58 32 - 75 %    LYMPHOCYTES 18 12 - 49 %    MONOCYTES 21 (H) 5 - 13 %    EOSINOPHILS 2 0 - 7 %    BASOPHILS 1 0 - 1 %    IMMATURE GRANULOCYTES 0 0.0 - 0.5 %    ABS. NEUTROPHILS 2.4 1.8 - 8.0 K/UL    ABS. LYMPHOCYTES 0.7 (L) 0.8 - 3.5 K/UL    ABS. MONOCYTES 0.8 0.0 - 1.0 K/UL    ABS. EOSINOPHILS 0.1 0.0 - 0.4 K/UL    ABS. BASOPHILS 0.0 0.0 - 0.1 K/UL    ABS. IMM.  GRANS. 0.0 0.00 - 0.04 K/UL    DF SMEAR SCANNED      RBC COMMENTS NORMOCYTIC, NORMOCHROMIC     METABOLIC PANEL, COMPREHENSIVE    Collection Time: 07/10/21  3:42 PM   Result Value Ref Range    Sodium 131 (L) 136 - 145 mmol/L    Potassium 3.4 (L) 3.5 - 5.1 mmol/L    Chloride 96 (L) 97 - 108 mmol/L    CO2 28 21 - 32 mmol/L    Anion gap 7 5 - 15 mmol/L    Glucose 92 65 - 100 mg/dL    BUN 4 (L) 6 - 20 MG/DL    Creatinine 0.66 0.55 - 1.02 MG/DL    BUN/Creatinine ratio 6 (L) 12 - 20      GFR est AA >60 >60 ml/min/1.73m2    GFR est non-AA >60 >60 ml/min/1.73m2    Calcium 9.3 8.5 - 10.1 MG/DL    Bilirubin, total 0.3 0.2 - 1.0 MG/DL    ALT (SGPT) 20 12 - 78 U/L    AST (SGOT) 19 15 - 37 U/L    Alk. phosphatase 50 45 - 117 U/L    Protein, total 7.1 6.4 - 8.2 g/dL    Albumin 3.9 3.5 - 5.0 g/dL    Globulin 3.2 2.0 - 4.0 g/dL    A-G Ratio 1.2 1.1 - 2.2     LIPASE    Collection Time: 07/10/21  3:42 PM   Result Value Ref Range    Lipase 67 (L) 73 - 393 U/L   URINALYSIS W/MICROSCOPIC    Collection Time: 07/10/21  3:42 PM   Result Value Ref Range    Color YELLOW/STRAW      Appearance CLEAR CLEAR      Specific gravity 1.007 1.003 - 1.030      pH (UA) 6.0 5.0 - 8.0      Protein Negative NEG mg/dL    Glucose Negative NEG mg/dL    Ketone TRACE (A) NEG mg/dL    Bilirubin Negative NEG      Blood Negative NEG      Urobilinogen 0.2 0.2 - 1.0 EU/dL    Nitrites Negative NEG      Leukocyte Esterase Negative NEG      WBC 0-4 0 - 4 /hpf    RBC 0-5 0 - 5 /hpf    Epithelial cells FEW FEW /lpf    Bacteria Negative NEG /hpf    Hyaline cast 0-2 0 - 5 /lpf   URINE CULTURE HOLD SAMPLE    Collection Time: 07/10/21  3:42 PM    Specimen: Serum; Urine   Result Value Ref Range    Urine culture hold        Urine on hold in Microbiology dept for 2 days. If unpreserved urine is submitted, it cannot be used for addtional testing after 24 hours, recollection will be required.    TROPONIN I    Collection Time: 07/10/21  3:42 PM   Result Value Ref Range    Troponin-I, Qt. 1.39 (H) <0.05 ng/mL   HCG URINE, QL. - POC    Collection Time: 07/10/21  3:46 PM   Result Value Ref Range    Pregnancy test,urine (POC) Negative NEG     COVID-19 RAPID TEST    Collection Time: 07/10/21  6:37 PM   Result Value Ref Range    Specimen source Nasopharyngeal      COVID-19 rapid test Indeterminate (A) NOTD     TROPONIN I    Collection Time: 07/10/21  6:37 PM   Result Value Ref Range    Troponin-I, Qt. 2.37 (H) <0.05 ng/mL   COVID-19 RAPID TEST    Collection Time: 07/10/21  7:14 PM   Result Value Ref Range    Specimen source Nasopharyngeal      COVID-19 rapid test Not detected NOTD     EKG, 12 LEAD, INITIAL    Collection Time: 07/10/21  7:35 PM Result Value Ref Range    Ventricular Rate 61 BPM    Atrial Rate 61 BPM    P-R Interval 156 ms    QRS Duration 84 ms    Q-T Interval 384 ms    QTC Calculation (Bezet) 386 ms    Calculated P Axis 56 degrees    Calculated R Axis 19 degrees    Calculated T Axis 23 degrees    Diagnosis       Normal sinus rhythm  When compared with ECG of 10-JUL-2021 15:39,  MANUAL COMPARISON REQUIRED, DATA IS UNCONFIRMED     TROPONIN I    Collection Time: 07/11/21  1:14 AM   Result Value Ref Range    Troponin-I, Qt. 2.69 (H) <0.05 ng/mL   CBC WITH AUTOMATED DIFF    Collection Time: 07/11/21  1:14 AM   Result Value Ref Range    WBC 2.9 (L) 3.6 - 11.0 K/uL    RBC 2.89 (L) 3.80 - 5.20 M/uL    HGB 9.0 (L) 11.5 - 16.0 g/dL    HCT 26.5 (L) 35.0 - 47.0 %    MCV 91.7 80.0 - 99.0 FL    MCH 31.1 26.0 - 34.0 PG    MCHC 34.0 30.0 - 36.5 g/dL    RDW 11.3 (L) 11.5 - 14.5 %    PLATELET 228 184 - 751 K/uL    MPV 10.1 8.9 - 12.9 FL    NRBC 0.0 0  WBC    ABSOLUTE NRBC 0.00 0.00 - 0.01 K/uL    NEUTROPHILS 45 32 - 75 %    LYMPHOCYTES 30 12 - 49 %    MONOCYTES 22 (H) 5 - 13 %    EOSINOPHILS 2 0 - 7 %    BASOPHILS 1 0 - 1 %    IMMATURE GRANULOCYTES 0 0.0 - 0.5 %    ABS. NEUTROPHILS 1.3 (L) 1.8 - 8.0 K/UL    ABS. LYMPHOCYTES 0.9 0.8 - 3.5 K/UL    ABS. MONOCYTES 0.6 0.0 - 1.0 K/UL    ABS. EOSINOPHILS 0.1 0.0 - 0.4 K/UL    ABS. BASOPHILS 0.0 0.0 - 0.1 K/UL    ABS. IMM.  GRANS. 0.0 0.00 - 0.04 K/UL    DF SMEAR SCANNED      RBC COMMENTS NORMOCYTIC, NORMOCHROMIC     METABOLIC PANEL, COMPREHENSIVE    Collection Time: 07/11/21  1:14 AM   Result Value Ref Range    Sodium 133 (L) 136 - 145 mmol/L    Potassium 3.1 (L) 3.5 - 5.1 mmol/L    Chloride 103 97 - 108 mmol/L    CO2 26 21 - 32 mmol/L    Anion gap 4 (L) 5 - 15 mmol/L    Glucose 104 (H) 65 - 100 mg/dL    BUN 5 (L) 6 - 20 MG/DL    Creatinine 0.52 (L) 0.55 - 1.02 MG/DL    BUN/Creatinine ratio 10 (L) 12 - 20      GFR est AA >60 >60 ml/min/1.73m2    GFR est non-AA >60 >60 ml/min/1.73m2    Calcium 7.5 (L) 8.5 - 10.1 MG/DL    Bilirubin, total 0.2 0.2 - 1.0 MG/DL    ALT (SGPT) 17 12 - 78 U/L    AST (SGOT) 21 15 - 37 U/L    Alk.  phosphatase 36 (L) 45 - 117 U/L    Protein, total 5.3 (L) 6.4 - 8.2 g/dL    Albumin 2.8 (L) 3.5 - 5.0 g/dL    Globulin 2.5 2.0 - 4.0 g/dL    A-G Ratio 1.1 1.1 - 2.2     LIPID PANEL    Collection Time: 07/11/21  1:14 AM   Result Value Ref Range    Cholesterol, total 121 <200 MG/DL    Triglyceride 92 <150 MG/DL    HDL Cholesterol 42 MG/DL    LDL, calculated 60.6 0 - 100 MG/DL    VLDL, calculated 18.4 MG/DL    CHOL/HDL Ratio 2.9 0.0 - 5.0     MAGNESIUM    Collection Time: 07/11/21  1:14 AM   Result Value Ref Range    Magnesium 1.7 1.6 - 2.4 mg/dL   ECHO ADULT COMPLETE    Collection Time: 07/11/21  9:29 AM   Result Value Ref Range    IVSd 0.83 0.60 - 0.90 cm    LVIDd 5.17 3.90 - 5.30 cm    LVIDs 3.32 cm    LVOT d 1.90 cm    LVPWd 0.88 0.60 - 0.90 cm    BP EF 66.1 55.0 - 100.0 percent    LV Ejection Fraction MOD 2C 67 percent    LV Ejection Fraction MOD 4C 66 percent    LV ED Vol A2C 80.39 mL    LV ED Vol A4C 84.46 mL    LV ED Vol BP 82.64 56.0 - 104.0 mL    LV ES Vol A2C 26.37 mL    LV ES Vol A4C 28.78 mL    LV ES Vol BP 27.98 19.0 - 49.0 mL    RVIDd 3.27 cm    RVSP 22.86 mmHg    Left Atrium Major Axis 3.95 cm    LA Volume 45.56 22.0 - 52.0 mL    LA Area 4C 13.63 cm2    LA Vol 2C 51.77 22.00 - 52.00 mL    LA Vol 4C 31.25 22.00 - 52.00 mL    Est. RA Pressure 3.00 mmHg    AV R PG 23.35 mmHg    Aortic Regurgitant Pressure Half-time 5,479.90 ms    AR Max Shawn 241.61 cm/s    AoV PG 8.01 mmHg    Aortic Valve Systolic Peak Velocity 936.26 cm/s    MV A Shawn 62.33 cm/s    Mitral Valve E Wave Deceleration Time 193.37 ms    MV E Shawn 92.78 cm/s    E/E' ratio (averaged) 9.28     E/E' lateral 7.63     E/E' septal 10.93     LV E' Lateral Velocity 12.16 cm/s    LV E' Septal Velocity 8.49 cm/s    Mitral Valve Pressure Half-time 56.08 ms    MVA (PHT) 3.92 cm2    TR Max Velocity 162.65 cm/s    Pulmonic Regurgitant End Max Velocity 97.16 cm/s    Pulmonic Valve Systolic Peak Instantaneous Gradient 2.61 mmHg    Pulmonic Valve Max Velocity 80.71 cm/s    Tapse 2.23 (A) 1.50 - 2.00 cm    Triscuspid Valve Regurgitation Peak Gradient 19.86 mmHg    TR Max Velocity 222.81 cm/s    Ao Root D 2.97 cm    MV E/A 1.49     LV Mass .6 67.0 - 162.0 g    LV Mass AL Index 84.2 43.0 - 95.0 g/m2    LVES Vol Index BP 15.0 mL/m2    LVED Vol Index BP 44.4 mL/m2    Left Atrium Minor Axis 2.12 cm    LA Vol Index 24.49 16.00 - 28.00 ml/m2    LA Vol Index 27.83 16.00 - 28.00 ml/m2    LA Vol Index 16.80 16.00 - 28.00 ml/m2    LVED Vol Index A4C 45.4 mL/m2    LVED Vol Index A2C 43.2 mL/m2    LVES Vol Index A4C 15.5 mL/m2    LVES Vol Index A2C 14.2 mL/m2       Greg Yadav MD

## 2021-07-11 NOTE — PROGRESS NOTES
[facility]  Hospitalist Group                                                                                          Hospitalist Progress Note  Sonam Medina MD  Answering service: [phone] from in house phone        Date of Service:  2021  NAME:  Reji Greenwood  :  1965  MRN:  [id number]      Admission Summary:   Reji Greenwood is a 49 y.o. female with no significant past medical history comes with complaints of chest pain and pressure along with diarrhea. Patient is awake, alert and oriented able to answer my question follow my request.  Data also obtained of the ED staff and extensive chart review. As per collective reports patient is a healthy lady who does not take any medications except for glucosamine and probiotics daily. Patient reports that she recently traveled back from [place] where she was a for vacation about a week ago and has been doing well but she developed diarrhea about 3 days ago. Diarrhea is nonbloody, nonmucoid, watery yellowish color diarrhea which happens in episodes and these episodes are associated with crampy abdominal pain. She also has lost appetite and has had poor oral intake in the last day and a half. Patient did not have any nausea or vomiting until this morning where she had nausea without any vomiting. She she also developed chest pains this morning. The pain she describes is as of chest pressure, starting since 10th a.m. along with slight dizziness and burning in the chest.  She feels as if her heartburn has gotten worse. She also reports that whenever she eats chest passes right through her but she only had had 24 BM this morning. She had a leftover [place] dinner on 2021. She also had fish tacos and lunch that day. She had no other sick contacts, no antibiotic use, no cough, fevers chills or rigors.   The chest pressure she described was as if somebody sitting on her chest and it lasted till 4 PM in the ED. In the ED she was normotensive, hemodynamically stable but was dizzy so received a fluid bolus of 1 L which improved her symptoms significantly. Blood work revealed hypokalemia, hyponatremia and elevated troponin at 1.39. EKG was negative for acute pathology. She received aspirin and cardiology was consulted. Cardiology recommended to get an echocardiogram and a Covid testing given diarrhea. We will admit the patient for further evaluation and management. Chart reviewed at length. Interval history / Subjective: Follow up chest pain/pressure feeling, diarrhea. Patient seen and examined at the bedside. Labs, images and notes reviewed  Discussed with nursing staff, orders reviewed. Plan discussed with patient/Family  Feeling better with breathing. Yesterday had a feeling of not able to take deep breath and central tightness/pressure feeling. No nausea today. Unable to eat much still. No diarrhea today, no cramping today, both better. Abnormal labs compared to yesterday. All blood counts low as well as CMP parameters somewhat off. Likely dilutional.  Per patient, drawn from the same line with IV fluid was going on. D/W cardiology team at length. Echo results noted. Dilated pulmonary artery, mild to moderate KS, mild TR, dilated RA. Would rule out PE. D/W nursing team, patient at length. Answered all her questions and concerns. Patient agreeable with the plan. Continue therapeutic Lovenox until further details available otherwise per cardiology could be discontinued just for elevated troponins. Assessment & Plan:     #NSTEMI, elevated troponins, POA, continue to uptrend. Max 2.69 on last troponin check earlier today 7/11 around 1:30 AM.? ACS VS VTE driven?   #Difficulty breathing with possible provoking factors for PErecent multiple road trips, dehydration from diarrhea since Thursday  #Diarrhea, POA with some abdominal cramps  #Hx of [employer] COVID-19 vaccine later half of the May 2021-2 weeks before 6/8/2021 per patient when she visited her family at [place]  #Dehydration   #Hypokalemia, likely due to GI loss  #Hyponatremia, likely due to GI loss and dehydration  #Hypomagnesemia  #Bradycardia, BB on hold    -Admitted to inpatient, [facility]  -Cardiology on board. Appreciate recommendations.  -Echocardiogram noted 7/11/2021  -Stat D-dimer ordered earlier  -Will get stat CTA chest to rule out PE, LE venous duplex US bilateral to rule out DVT  -Will get stat CT A/P W contrast to rule out any intra-abdominal/infectious etiology  -Continue full dose Lovenox  -If VTE: DVT/PE +, get hematooncology consultation and transition to NOAC in another 24-48 hours on full dose Lovenox. -Repeat CBC, BMP, magnesium stat 7/11 to verify any dilutional labs from earlier today  -Stress test tomorrow per cardiology  -EKG pending, cardiology requested with nursing team  -Continue aspirin, Crestor  -Continue PPI IV daily for now. Further based on CT A/P with contrast.  No oral contrast.  -Continue IVF: NS at 125 mL/h at least for another day given contrasted CT scan studies  -Monitor CBC, CMP, magnesium, phosphorus daily until stable  -Continue close hemodynamic monitoring  -Further troponin monitoring per cardiology    Echocardiogram 7/11/2021:  Final result ·   PV: Mild to moderate pulmonic valve regurgitation is present. · PA: Dilated pulmonary artery. · LV: Estimated LVEF is 50 - 55%. Normal cavity size, wall thickness and diastolic function. Low normal systolic function. Wall motion: normal. Normal left ventricular strain. · RA: Mildly dilated right atrium. · TV: Mild tricuspid valve regurgitation is present. Pulmonary hypertension not suggested by Doppler findings.        Code status: Full code  DVT prophylaxis: Lovenox full dose SQ twice daily    Care Plan discussed with: Patient/Family, Nurse and   Anticipated Disposition: Home w/Family  Anticipated Discharge: Greater than 48 hours     Hospital Problems  Date Reviewed: 7/11/2021        Codes Class Noted POA    Diarrhea of presumed infectious origin ICD-10-CM: R19.7  ICD-9-CM: 009.3  7/11/2021 Unknown        NSTEMI (non-ST elevated myocardial infarction) [facility]) ICD-10-CM: I21.4  ICD-9-CM: 410.70  7/10/2021 Unknown                Review of Systems:   A comprehensive review of systems was negative except for that written in the HPI. Vital Signs:    Last 24hrs VS reviewed since prior progress note. Most recent are:  Visit Vitals  /66   Pulse (!) 58   Temp 98.6 °F (37 °C)   Resp 16   Ht 5' 8\" (1.727 m)   Wt 73 kg (160 lb 15 oz)   SpO2 99%   BMI 24.47 kg/m²         Intake/Output Summary (Last 24 hours) at 7/11/2021 1243  Last data filed at 7/11/2021 6884  Gross per 24 hour   Intake 220 ml   Output 300 ml   Net -80 ml        Physical Examination:     I had a face to face encounter with this patient and independently examined them on 7/11/2021 as outlined below:          Constitutional:  No acute distress, cooperative, pleasant    ENT:  Oral mucosa moist, oropharynx benign. Resp:  CTA bilaterally. No wheezing/rhonchi/rales. No accessory muscle use   CV:  Regular rhythm, normal rate, no murmurs, gallops, rubs    GI:  Soft, non distended, non tender. normoactive bowel sounds, no hepatosplenomegaly     Musculoskeletal:  No edema, warm, 2+ pulses throughout    Neurologic:  Moves all extremities. AAOx3, CN II-XII reviewed            Data Review:    Review and/or order of clinical lab test  Review and/or order of tests in the radiology section of CPT  Review and/or order of tests in the medicine section of CPT    XR CHEST PORT    Result Date: 7/10/2021  No acute findings.       Labs:     Recent Labs     07/11/21  0114 07/10/21  1542   WBC 2.9* 4.0   HGB 9.0* 11.9   HCT 26.5* 34.8*    216     Recent Labs     07/11/21  0114 07/10/21  1542   * 131*   K 3.1* 3.4*    96*   CO2 26 28   BUN 5* 4*   CREA 0.52* 0.66   * 92   CA 7.5* 9.3 MG 1.7  --      Recent Labs     07/11/21  0114 07/10/21  1542   ALT 17 20   AP 36* 50   TBILI 0.2 0.3   TP 5.3* 7.1   ALB 2.8* 3.9   GLOB 2.5 3.2   LPSE  --  67*     No results for input(s): INR, PTP, APTT, INREXT in the last 72 hours. No results for input(s): FE, TIBC, PSAT, FERR in the last 72 hours. No results found for: FOL, RBCF   No results for input(s): PH, PCO2, PO2 in the last 72 hours.   Recent Labs     07/11/21  0114 07/10/21  1837 07/10/21  1542   TROIQ 2.69* 2.37* 1.39*     Lab Results   Component Value Date/Time    Cholesterol, total 121 07/11/2021 01:14 AM    HDL Cholesterol 42 07/11/2021 01:14 AM    LDL, calculated 60.6 07/11/2021 01:14 AM    Triglyceride 92 07/11/2021 01:14 AM    CHOL/HDL Ratio 2.9 07/11/2021 01:14 AM     No results found for: [facility]  Lab Results   Component Value Date/Time    Color YELLOW/STRAW 07/10/2021 03:42 PM    Appearance CLEAR 07/10/2021 03:42 PM    Specific gravity 1.007 07/10/2021 03:42 PM    pH (UA) 6.0 07/10/2021 03:42 PM    Protein Negative 07/10/2021 03:42 PM    Glucose Negative 07/10/2021 03:42 PM    Ketone TRACE (A) 07/10/2021 03:42 PM    Bilirubin Negative 07/10/2021 03:42 PM    Urobilinogen 0.2 07/10/2021 03:42 PM    Nitrites Negative 07/10/2021 03:42 PM    Leukocyte Esterase Negative 07/10/2021 03:42 PM    Epithelial cells FEW 07/10/2021 03:42 PM    Bacteria Negative 07/10/2021 03:42 PM    WBC 0-4 07/10/2021 03:42 PM    RBC 0-5 07/10/2021 03:42 PM         Medications Reviewed:     Current Facility-Administered Medications   Medication Dose Route Frequency    sodium chloride (NS) flush 5-40 mL  5-40 mL IntraVENous Q8H    sodium chloride (NS) flush 5-40 mL  5-40 mL IntraVENous PRN    nitroglycerin (NITROBID) 2 % ointment 1 Inch  1 Inch Topical Q6H    [Held by provider] metoprolol tartrate (LOPRESSOR) tablet 12.5 mg  12.5 mg Oral BID    aspirin chewable tablet 81 mg  81 mg Oral DAILY    rosuvastatin (CRESTOR) tablet 40 mg  40 mg Oral QHS    acetaminophen (TYLENOL) tablet 650 mg  650 mg Oral Q4H PRN    oxyCODONE IR (ROXICODONE) tablet 5 mg  5 mg Oral Q4H PRN    morphine injection 2 mg  2 mg IntraVENous Q4H PRN    pantoprazole (PROTONIX) 40 mg in 0.9% sodium chloride 10 mL injection  40 mg IntraVENous DAILY    0.9% sodium chloride infusion  125 mL/hr IntraVENous CONTINUOUS    ondansetron (ZOFRAN) injection 4 mg  4 mg IntraVENous Q8H PRN    enoxaparin (LOVENOX) injection 70 mg  1 mg/kg SubCUTAneous Q12H    melatonin tablet 3 mg  3 mg Oral QHS PRN    simethicone (MYLICON) tablet 80 mg  80 mg Oral QID PRN     ______________________________________________________________________  EXPECTED LENGTH OF STAY: - - -  ACTUAL LENGTH OF STAY:          1                 Suzanne Garcia MD

## 2021-07-12 LAB
ANION GAP SERPL CALC-SCNC: 5 MMOL/L (ref 5–15)
BUN SERPL-MCNC: 3 MG/DL (ref 6–20)
BUN/CREAT SERPL: 5 (ref 12–20)
CALCIUM SERPL-MCNC: 8.2 MG/DL (ref 8.5–10.1)
CHLORIDE SERPL-SCNC: 110 MMOL/L (ref 97–108)
CO2 SERPL-SCNC: 27 MMOL/L (ref 21–32)
COMMENT, HOLDF: NORMAL
CREAT SERPL-MCNC: 0.59 MG/DL (ref 0.55–1.02)
GLUCOSE SERPL-MCNC: 81 MG/DL (ref 65–100)
MAGNESIUM SERPL-MCNC: 2.2 MG/DL (ref 1.6–2.4)
POTASSIUM SERPL-SCNC: 3.5 MMOL/L (ref 3.5–5.1)
SAMPLES BEING HELD,HOLD: NORMAL
SODIUM SERPL-SCNC: 142 MMOL/L (ref 136–145)
TROPONIN I SERPL-MCNC: 9.01 NG/ML
TSH SERPL DL<=0.05 MIU/L-ACNC: 2.76 UIU/ML (ref 0.36–3.74)

## 2021-07-12 PROCEDURE — C1876 STENT, NON-COA/NON-COV W/DEL: HCPCS | Performed by: INTERNAL MEDICINE

## 2021-07-12 PROCEDURE — 4A023N7 MEASUREMENT OF CARDIAC SAMPLING AND PRESSURE, LEFT HEART, PERCUTANEOUS APPROACH: ICD-10-PCS | Performed by: INTERNAL MEDICINE

## 2021-07-12 PROCEDURE — 77030004532 HC CATH ANGI DX IMP BSC -A: Performed by: INTERNAL MEDICINE

## 2021-07-12 PROCEDURE — 93458 L HRT ARTERY/VENTRICLE ANGIO: CPT | Performed by: INTERNAL MEDICINE

## 2021-07-12 PROCEDURE — 99152 MOD SED SAME PHYS/QHP 5/>YRS: CPT | Performed by: INTERNAL MEDICINE

## 2021-07-12 PROCEDURE — 80048 BASIC METABOLIC PNL TOTAL CA: CPT

## 2021-07-12 PROCEDURE — 74011250637 HC RX REV CODE- 250/637: Performed by: NURSE PRACTITIONER

## 2021-07-12 PROCEDURE — B2111ZZ FLUOROSCOPY OF MULTIPLE CORONARY ARTERIES USING LOW OSMOLAR CONTRAST: ICD-10-PCS | Performed by: INTERNAL MEDICINE

## 2021-07-12 PROCEDURE — 77030019569 HC BND COMPR RAD TERU -B: Performed by: INTERNAL MEDICINE

## 2021-07-12 PROCEDURE — 74011250636 HC RX REV CODE- 250/636: Performed by: INTERNAL MEDICINE

## 2021-07-12 PROCEDURE — 65660000000 HC RM CCU STEPDOWN

## 2021-07-12 PROCEDURE — 99233 SBSQ HOSP IP/OBS HIGH 50: CPT | Performed by: INTERNAL MEDICINE

## 2021-07-12 PROCEDURE — 77030010221 HC SPLNT WR POS TELE -B: Performed by: INTERNAL MEDICINE

## 2021-07-12 PROCEDURE — 77030016699 HC CATH ANGI DX INFN1 CARD -A: Performed by: INTERNAL MEDICINE

## 2021-07-12 PROCEDURE — 74011000636 HC RX REV CODE- 636: Performed by: INTERNAL MEDICINE

## 2021-07-12 PROCEDURE — C9113 INJ PANTOPRAZOLE SODIUM, VIA: HCPCS | Performed by: INTERNAL MEDICINE

## 2021-07-12 PROCEDURE — 77030013744: Performed by: INTERNAL MEDICINE

## 2021-07-12 PROCEDURE — C1894 INTRO/SHEATH, NON-LASER: HCPCS | Performed by: INTERNAL MEDICINE

## 2021-07-12 PROCEDURE — 74011000250 HC RX REV CODE- 250: Performed by: INTERNAL MEDICINE

## 2021-07-12 PROCEDURE — 84443 ASSAY THYROID STIM HORMONE: CPT

## 2021-07-12 PROCEDURE — C1769 GUIDE WIRE: HCPCS | Performed by: INTERNAL MEDICINE

## 2021-07-12 PROCEDURE — 36415 COLL VENOUS BLD VENIPUNCTURE: CPT

## 2021-07-12 PROCEDURE — 74011250637 HC RX REV CODE- 250/637: Performed by: INTERNAL MEDICINE

## 2021-07-12 PROCEDURE — 84484 ASSAY OF TROPONIN QUANT: CPT

## 2021-07-12 PROCEDURE — 83735 ASSAY OF MAGNESIUM: CPT

## 2021-07-12 PROCEDURE — 99153 MOD SED SAME PHYS/QHP EA: CPT | Performed by: INTERNAL MEDICINE

## 2021-07-12 RX ORDER — MIDAZOLAM HYDROCHLORIDE 1 MG/ML
INJECTION, SOLUTION INTRAMUSCULAR; INTRAVENOUS AS NEEDED
Status: DISCONTINUED | OUTPATIENT
Start: 2021-07-12 | End: 2021-07-12 | Stop reason: HOSPADM

## 2021-07-12 RX ORDER — SODIUM CHLORIDE 0.9 % (FLUSH) 0.9 %
5-40 SYRINGE (ML) INJECTION AS NEEDED
Status: DISCONTINUED | OUTPATIENT
Start: 2021-07-12 | End: 2021-07-13 | Stop reason: HOSPADM

## 2021-07-12 RX ORDER — HEPARIN SODIUM 1000 [USP'U]/ML
INJECTION, SOLUTION INTRAVENOUS; SUBCUTANEOUS AS NEEDED
Status: DISCONTINUED | OUTPATIENT
Start: 2021-07-12 | End: 2021-07-12 | Stop reason: HOSPADM

## 2021-07-12 RX ORDER — SODIUM CHLORIDE 9 MG/ML
500 INJECTION, SOLUTION INTRAVENOUS CONTINUOUS
Status: DISCONTINUED | OUTPATIENT
Start: 2021-07-12 | End: 2021-07-13 | Stop reason: HOSPADM

## 2021-07-12 RX ORDER — FENTANYL CITRATE 50 UG/ML
INJECTION, SOLUTION INTRAMUSCULAR; INTRAVENOUS AS NEEDED
Status: DISCONTINUED | OUTPATIENT
Start: 2021-07-12 | End: 2021-07-12 | Stop reason: HOSPADM

## 2021-07-12 RX ORDER — LIDOCAINE HYDROCHLORIDE 10 MG/ML
INJECTION INFILTRATION; PERINEURAL AS NEEDED
Status: DISCONTINUED | OUTPATIENT
Start: 2021-07-12 | End: 2021-07-12 | Stop reason: HOSPADM

## 2021-07-12 RX ORDER — SODIUM CHLORIDE 0.9 % (FLUSH) 0.9 %
5-40 SYRINGE (ML) INJECTION EVERY 8 HOURS
Status: DISCONTINUED | OUTPATIENT
Start: 2021-07-12 | End: 2021-07-13 | Stop reason: HOSPADM

## 2021-07-12 RX ADMIN — ASPIRIN 81 MG: 81 TABLET, CHEWABLE ORAL at 09:46

## 2021-07-12 RX ADMIN — SODIUM CHLORIDE 40 MG: 9 INJECTION INTRAMUSCULAR; INTRAVENOUS; SUBCUTANEOUS at 09:45

## 2021-07-12 RX ADMIN — Medication 10 ML: at 23:59

## 2021-07-12 RX ADMIN — Medication 10 ML: at 06:33

## 2021-07-12 RX ADMIN — MORPHINE SULFATE 1 MG: 2 INJECTION, SOLUTION INTRAMUSCULAR; INTRAVENOUS at 21:10

## 2021-07-12 RX ADMIN — ENOXAPARIN SODIUM 70 MG: 80 INJECTION SUBCUTANEOUS at 06:33

## 2021-07-12 RX ADMIN — SODIUM CHLORIDE 125 ML/HR: 9 INJECTION, SOLUTION INTRAVENOUS at 16:09

## 2021-07-12 RX ADMIN — OXYCODONE 5 MG: 5 TABLET ORAL at 23:58

## 2021-07-12 RX ADMIN — Medication 5 ML: at 15:32

## 2021-07-12 RX ADMIN — Medication 10 ML: at 13:52

## 2021-07-12 RX ADMIN — Medication 3 MG: at 21:10

## 2021-07-12 RX ADMIN — OXYCODONE 5 MG: 5 TABLET ORAL at 19:26

## 2021-07-12 RX ADMIN — Medication 10 ML: at 21:10

## 2021-07-12 NOTE — PROGRESS NOTES
Pt with rising troponin, now 9. No current chest pressure. D/w Dr. Denise Parekh. Will proceed with [facility] today at 3:45 PM.  NPO at [facility], hold lovenox. D/w Pt and she is agreeable to proceed with C.

## 2021-07-12 NOTE — PROGRESS NOTES
Cardiology Progress Note           7/10/2021  3:16 PM   NSTEMI (non-ST elevated myocardial infarction) ([facility] [I21.4]     HPI: Fred Sadler is a 49 y.o. female admitted for NSTEMI (non-ST elevated myocardial infarction) ([facility] [I21.4]. No PMH of cardiac disease. She presented with chief c/o chest pain, making it difficult to take a deep breath. Has had few epsiodes over preceding few days- initially episodes started last Thursday at f4AM,described pain as a heart burn but then changed to a constriction. Pain not exertional. She felt hot and then cold. Has had diarrhea as well. She had echo which showed NL LV function EF 55-60%, NWMA. She reports pain was relieved by morphine. No other relieving factor. Reports J&J covid vaccine approximately 5/28/21. Her troponins are trending up - now 9.01 this a.m. CTA chest on admission shows no PE or aortic aneurysm. Investigation  Telemetry: NSR  ECG: NSR, no ischemic findings  Echocardiogram: EF 50-55%, NWMA, mild TR. No pulmonary HTN     Assessment and PLAN     1. Elevated troponin/NSTEMI  2. Chest pain, intermittent  3. Anemia, normocytic  4. Leukopenia  5. Diarrhea on admission  6. Sinus bradycardia    49 y.o. female who presents with intermittent nonexertional chest pain and markedly elevated troponin . EKG shows no ischemic changes. She is currently not having chest pain. Since the symptoms primarily started with diarrhea, possibility of stress cardiomyopathy cannot be completely excluded. Nonetheless we will treat her as primarily ACS at this time given degree of troponin elevation to up to 9. .  She had her dose of J&J vaccine in late May. Hold lovenox (last dose 4AM) as will proceed with [facility] today. Continue ASA, statin for now, nitropaste for now. I discussed the risks/benefits/alternatives of the procedure with the patient.  Risks include (but are not limited to) bleeding, infection,stroke,heart attack, need for emergency surgery/pericardiocentesis, need for dialysis or death. The patient understands and agrees to proceed. Rest of the recommendations to follow cardiac catheterization. []    High complexity decision making was performed   []    Patient is at high-risk of decompensation with multiple organ involvement     Review of Symptoms:  Respiratory: No exertional dyspnea, orthopnea, PND, cough, hemoptysis, URI. Cardiovascular: +intermittent CP, no palpitations, sweating, lightheadedness, dizziness, syncope, presyncope, lower extremity swelling. Otherwise no other pertinent positive or negative symptoms on ROS. Patient Active Problem List    Diagnosis Date Noted    Diarrhea of presumed infectious origin 07/11/2021    NSTEMI (non-ST elevated myocardial infarction) ([facility] 07/10/2021      None  Past Medical History:   Diagnosis Date    Cervical spondylosis with radiculopathy       Past Surgical History:   Procedure Laterality Date    HX GYN  2001    cervical conization      Social History     Socioeconomic History    Marital status:      Spouse name: Not on file    Number of children: Not on file    Years of education: Not on file    Highest education level: Not on file   Tobacco Use    Smoking status: Current Some Day Smoker    Smokeless tobacco: Never Used   Substance and Sexual Activity    Alcohol use: Yes    Drug use: Never     Social Determinants of Health     Financial Resource Strain:     Difficulty of Paying Living Expenses:    Food Insecurity:     Worried About Running Out of Food in the Last Year:     [address] in the Last Year:    Transportation Needs:     Lack of Transportation (Medical):      Lack of Transportation (Non-Medical):    Physical Activity:     Days of Exercise per Week:     Minutes of Exercise per Session:    Stress:     Feeling of Stress :    Social Connections:     Frequency of Communication with Friends and Family:     Frequency of Social Gatherings with Friends and Family:     Attends [employer] Services:     Active Member of Clubs or Organizations:     Attends Club or Organization Meetings:     Marital Status:      History reviewed. No pertinent family history.    Current Facility-Administered Medications   Medication Dose Route Frequency    sodium chloride (NS) flush 5-40 mL  5-40 mL IntraVENous Q8H    sodium chloride (NS) flush 5-40 mL  5-40 mL IntraVENous PRN    nitroglycerin (NITROBID) 2 % ointment 1 Inch  1 Inch Topical Q6H    [Held by provider] metoprolol tartrate (LOPRESSOR) tablet 12.5 mg  12.5 mg Oral BID    aspirin chewable tablet 81 mg  81 mg Oral DAILY    rosuvastatin (CRESTOR) tablet 40 mg  40 mg Oral QHS    acetaminophen (TYLENOL) tablet 650 mg  650 mg Oral Q4H PRN    oxyCODONE IR (ROXICODONE) tablet 5 mg  5 mg Oral Q4H PRN    morphine injection 2 mg  2 mg IntraVENous Q4H PRN    pantoprazole (PROTONIX) 40 mg in 0.9% sodium chloride 10 mL injection  40 mg IntraVENous DAILY    0.9% sodium chloride infusion  125 mL/hr IntraVENous CONTINUOUS    ondansetron (ZOFRAN) injection 4 mg  4 mg IntraVENous Q8H PRN    [Held by provider] enoxaparin (LOVENOX) injection 70 mg  1 mg/kg SubCUTAneous Q12H    melatonin tablet 3 mg  3 mg Oral QHS PRN    simethicone (MYLICON) tablet 80 mg  80 mg Oral QID PRN      None      No Known Allergies    Labs:   Recent Results (from the past 24 hour(s))   RESPIRATORY PANEL,PCR,NASOPHARYNGEAL    Collection Time: 07/11/21  5:34 PM    Specimen: Nasopharyngeal   Result Value Ref Range    Adenovirus Not detected NOTD      Coronavirus 229E Not detected NOTD      Coronavirus HKU1 Not detected NOTD      Coronavirus CVNL63 Not detected NOTD      Coronavirus OC43 Not detected NOTD      Metapneumovirus PENDING NOTD    Rhinovirus and Enterovirus PENDING NOTD    Influenza A PENDING NOTD    Influenza A, subtype H1 PENDING NOTD    Influenza A, subtype H3 PENDING NOTD    INFLUENZA A H1N1 PCR PENDING NOTD    Influenza B PENDING NOTD    Parainfluenza 1 PENDING NOTD    Parainfluenza 2 PENDING NOTD    Parainfluenza 3 PENDING NOTD    Parainfluenza virus 4 PENDING NOTD    RSV by PCR PENDING NOTD    B. parapertussis, PCR Not detected NOTD      Bordetella pertussis - PCR Not detected NOTD      Chlamydophila pneumoniae DNA, QL, PCR Not detected NOTD      Mycoplasma pneumoniae DNA, QL, PCR Not detected NOTD     MICROALBUMIN, UR, RAND W/ MICROALB/CREAT RATIO    Collection Time: 07/11/21  6:49 PM   Result Value Ref Range    Microalbumin,urine random <0.50 MG/DL    Creatinine, urine <13.00 mg/dL    Microalbumin/Creat ratio (mg/g creat) Cannot be calculated 0 - 30 mg/g   OSMOLALITY, UR    Collection Time: 07/11/21  7:51 PM   Result Value Ref Range    Osmolality,urine 119 MOSM/kg H2O   SODIUM, UR, RANDOM    Collection Time: 07/11/21  7:51 PM   Result Value Ref Range    Sodium,urine random 25 MMOL/L   CREATININE, UR, RANDOM    Collection Time: 07/11/21  7:51 PM   Result Value Ref Range    Creatinine, urine <13.00 mg/dL   POTASSIUM, UR, RANDOM    Collection Time: 07/11/21  7:51 PM   Result Value Ref Range    Potassium urine, random 2 MMOL/L   DRUG SCREEN, URINE    Collection Time: 07/11/21  7:51 PM   Result Value Ref Range    AMPHETAMINES Negative NEG      BARBITURATES Negative NEG      BENZODIAZEPINES Negative NEG      COCAINE Negative NEG      METHADONE Negative NEG      OPIATES Negative NEG      PCP(PHENCYCLIDINE) Negative NEG      THC (TH-CANNABINOL) Negative NEG      Drug screen comment (NOTE)    SAMPLES BEING HELD    Collection Time: 07/11/21  7:52 PM   Result Value Ref Range    SAMPLES BEING HELD        Add-on orders for these samples will be processed based on acceptable specimen integrity and analyte stability, which may vary by analyte. COMMENT        Add-on orders for these samples will be processed based on acceptable specimen integrity and analyte stability, which may vary by analyte.    SAMPLES BEING HELD    Collection Time: 07/12/21  4:24 AM   Result Value Ref Range    SAMPLES BEING HELD  1 LAV     COMMENT        Add-on orders for these samples will be processed based on acceptable specimen integrity and analyte stability, which may vary by analyte. MAGNESIUM    Collection Time: 07/12/21  4:30 AM   Result Value Ref Range    Magnesium 2.2 1.6 - 2.4 mg/dL   METABOLIC PANEL, BASIC    Collection Time: 07/12/21  4:30 AM   Result Value Ref Range    Sodium 142 136 - 145 mmol/L    Potassium 3.5 3.5 - 5.1 mmol/L    Chloride 110 (H) 97 - 108 mmol/L    CO2 27 21 - 32 mmol/L    Anion gap 5 5 - 15 mmol/L    Glucose 81 65 - 100 mg/dL    BUN 3 (L) 6 - 20 MG/DL    Creatinine 0.59 0.55 - 1.02 MG/DL    BUN/Creatinine ratio 5 (L) 12 - 20      GFR est AA >60 >60 ml/min/1.73m2    GFR est non-AA >60 >60 ml/min/1.73m2    Calcium 8.2 (L) 8.5 - 10.1 MG/DL   TROPONIN I    Collection Time: 07/12/21  4:30 AM   Result Value Ref Range    Troponin-I, Qt. 9.01 (H) <0.05 ng/mL   TSH 3RD GENERATION    Collection Time: 07/12/21  4:30 AM   Result Value Ref Range    TSH 2.76 0.36 - 3.74 uIU/mL       Intake/Output Summary (Last 24 hours) at 7/12/2021 1314  Last data filed at 7/12/2021 1042  Gross per 24 hour   Intake 1300 ml   Output 1900 ml   Net -600 ml      Patient Vitals for the past 24 hrs:   Temp Pulse Resp BP SpO2   07/12/21 1200 98.4 °F (36.9 °C) (!) 57 19 133/89 97 %   07/12/21 0751 98.3 °F (36.8 °C) (!) 59 18 112/82 99 %   07/12/21 0420 97.6 °F (36.4 °C) (!) 53 16 113/84 95 %   07/11/21 2306 98.6 °F (37 °C) 66 18 118/77 99 %   07/11/21 1932 98.5 °F (36.9 °C) 63 18 127/77 99 %   07/11/21 1530 98 °F (36.7 °C) 66 16 118/74 98 %      General:    Alert, cooperative, no distress. Psychiatric:    Normal Mood and affect    Eye/ENT:      Pupils equal, No asymmetry, Conjunctival pink. Able to hear voice at normal amplitude   Lungs:      Visibly symmetric chest expansion, No palpable tenderness. Clear to auscultation bilaterally.     Heart[de-identified]    Regular rate and rhythm, S1, S2 normal, no murmur, click, rub or gallop. No JVD, Normal palpable peripheral pulses. No cyanosis   Abdomen:     Soft, non-tender. Bowel sounds normal. No masses,  No      organomegaly. Extremities:   Extremities normal, atraumatic, no edema. Neurologic:   CN II-XII grossly intact. No gross focal deficits         Brittani Guy NP  7/12/2021   1:14 PM     [facility] Office:   Patrick Lamb Office:  [address] [place] [address] Dr    [facility] [address]  P: [phone]    P: [phone]  F: [phone]    F: [phone]

## 2021-07-12 NOTE — PROGRESS NOTES
1600: 2cc of air out from TR band, no bleedind and hematoma observed. 1620: 2cc of air out from TR band, no bleedind and hematoma observed. 1700: 2cc of air out from TR band, no bleedind and hematoma observed. 1730: 2cc of air out from TR band, no bleedind and hematoma observed. 1750: 2cc of air out from TR band, no bleedind and hematoma observed. Gauze and dressing applied    Problem: Falls - Risk of  Goal: *Absence of Falls  Description: Document Alphonso Fall Risk and appropriate interventions in the flowsheet.   Outcome: Progressing Towards Goal  Note: Fall Risk Interventions:    Medication Interventions: Teach patient to arise slowly

## 2021-07-12 NOTE — PROGRESS NOTES
[address] [facility] Adult  Hospitalist Group                                                                                          Hospitalist Progress Note  Soham Rizo MD  Answering service: [phone] from in house phone        Date of Service:  2021  NAME:  Vishal Schmidt  :  1965  MRN:  [id number]      Admission Summary:   Vishal Schmidt is a 49 y.o. female with no significant past medical history comes with complaints of chest pain and pressure along with diarrhea. Patient is awake, alert and oriented able to answer my question follow my request.  Data also obtained of the ED staff and extensive chart review. As per collective reports patient is a healthy lady who does not take any medications except for glucosamine and probiotics daily. Patient reports that she recently traveled back from [facility] where she was a for vacation about a week ago and has been doing well but she developed diarrhea about 3 days ago. Diarrhea is nonbloody, nonmucoid, watery yellowish color diarrhea which happens in episodes and these episodes are associated with crampy abdominal pain. She also has lost appetite and has had poor oral intake in the last day and a half. Patient did not have any nausea or vomiting until this morning where she had nausea without any vomiting. She she also developed chest pains this morning. The pain she describes is as of chest pressure, starting since 10th a.m. along with slight dizziness and burning in the chest.  She feels as if her heartburn has gotten worse. She also reports that whenever she eats chest passes right through her but she only had had 24 BM this morning. She had a leftover [place] dinner on 2021. She also had fish tacos and lunch that day. She had no other sick contacts, no antibiotic use, no cough, fevers chills or rigors.   The chest pressure she described was as if somebody sitting on her chest and it lasted till 4 PM in the ED. In the ED she was normotensive, hemodynamically stable but was dizzy so received a fluid bolus of 1 L which improved her symptoms significantly. Blood work revealed hypokalemia, hyponatremia and elevated troponin at 1.39. EKG was negative for acute pathology. She received aspirin and cardiology was consulted. Cardiology recommended to get an echocardiogram and a Covid testing given diarrhea. We will admit the patient for further evaluation and management. Chart reviewed at length. Interval history / Subjective: Follow up chest pain/pressure feeling, diarrhea. Patient seen and examined at the bedside. Labs, images and notes reviewed  Discussed with nursing staff, orders reviewed. Plan discussed with patient/Family  Feeling okay. Denied any concerns. Troponin continues to rise, now 9. LHC later today per cardiology team  No ongoing symptoms at present. Assessment & Plan:     #NSTEMI, elevated troponins, POA, continue to uptrend. Peaking at 9.01.? ACS VS VTE driven? LHC on 7/12/2021 today  #Difficulty breathing with possible provoking factors for PErecent multiple road trips, dehydration from diarrhea since Thursday. VTE ruled out: PE ruled out with CTA chest 7/11, also lower extremity venous duplex negative for DVT. #Diarrhea, POA with some abdominal cramps  #Hx of J&J COVID-19 vaccine later half of the May 2021-2 weeks before 6/8/2021 per patient when she visited her family at [place]  #Dehydration   #Hypokalemia, likely due to GI loss  #Hyponatremia, likely due to GI loss and dehydration  #Hypomagnesemia  #Bradycardia, BB on hold  #Gallbladder wall edema as noted on CT scan    -Admitted to inpatient, [facility]  -Cardiology on board. Appreciate recommendations. -ID on board. Appreciate recommendations. ? Myocarditis, especially if LHC unremarkable. -Viral respiratory panel requested 7/11.   Partial panel negative so far  -Echocardiogram noted 7/11/2021  -If VTE: DVT/PE +, get hematooncology consultation and transition to NOAC in another 24-48 hours on full dose Lovenox.  -With uptrending troponin, LHC per cardiology on 7/12/2021today  -Continue aspirin, Crestor  -Continue PPI IV daily for now. Further based on CT A/P with contrast.  No oral contrast.  -Continue IVF: NS at 125 mL/h at least for another day given contrasted CT scans and LHC  -Monitor CBC, CMP, magnesium, phosphorus daily until stable  -Continue close hemodynamic monitoring  -Further troponin monitoring per cardiology  -Also RUQ US and HIDA scan for gallbladder wall edema noted on CT scan    Echocardiogram 7/11/2021:  Final result ·   PV: Mild to moderate pulmonic valve regurgitation is present. · PA: Dilated pulmonary artery. · LV: Estimated LVEF is 50 - 55%. Normal cavity size, wall thickness and diastolic function. Low normal systolic function. Wall motion: normal. Normal left ventricular strain. · RA: Mildly dilated right atrium. · TV: Mild tricuspid valve regurgitation is present. Pulmonary hypertension not suggested by Doppler findings. Code status: Full code  DVT prophylaxis: Lovenox full dose SQ twice daily    Care Plan discussed with: Patient/Family, Nurse and   Anticipated Disposition: Home w/Family  Anticipated Discharge: Greater than 48 hours     Hospital Problems  Date Reviewed: 7/11/2021        Codes Class Noted POA    Diarrhea of presumed infectious origin ICD-10-CM: R19.7  ICD-9-CM: 009.3  7/11/2021 Unknown        NSTEMI (non-ST elevated myocardial infarction) [facility]) ICD-10-CM: I21.4  ICD-9-CM: 410.70  7/10/2021 Unknown                Review of Systems:   A comprehensive review of systems was negative except for that written in the HPI. Vital Signs:    Last 24hrs VS reviewed since prior progress note.  Most recent are:  Visit Vitals  /78   Pulse 63   Temp 98 °F (36.7 °C)   Resp 18   Ht 5' 8\" (1.727 m)   Wt 72.8 kg (160 lb 7.9 oz)   SpO2 98%   BMI 24.40 kg/m² Intake/Output Summary (Last 24 hours) at 7/12/2021 1859  Last data filed at 7/12/2021 1802  Gross per 24 hour   Intake 1540 ml   Output 2300 ml   Net -760 ml        Physical Examination:     I had a face to face encounter with this patient and independently examined them on 7/12/2021 as outlined below: Unchanged from previous day          Constitutional:  No acute distress, cooperative, pleasant    ENT:  Oral mucosa moist, oropharynx benign. Resp:  CTA bilaterally. No wheezing/rhonchi/rales. No accessory muscle use   CV:  Regular rhythm, normal rate, no murmurs, gallops, rubs    GI:  Soft, non distended, non tender. normoactive bowel sounds, no hepatosplenomegaly     Musculoskeletal:  No edema, warm, 2+ pulses throughout    Neurologic:  Moves all extremities. AAOx3, CN II-XII reviewed            Data Review:    Review and/or order of clinical lab test  Review and/or order of tests in the radiology section of CPT  Review and/or order of tests in the medicine section of CPT    CTA CHEST W OR W WO CONT    Result Date: 7/11/2021  Chest: 1. No evidence of pulmonary embolism. 2. Trace bilateral pleural effusions and mild bilateral lower lobe subsegmental atelectasis. Abdomen/pelvis: 1. Findings of fluid overload with small volume ascites, mild periportal edema and mild gallbladder wall edema. 2. Otherwise no evidence of acute process in the abdomen or pelvis. CT ABD PELV W CONT    Result Date: 7/11/2021  Chest: 1. No evidence of pulmonary embolism. 2. Trace bilateral pleural effusions and mild bilateral lower lobe subsegmental atelectasis. Abdomen/pelvis: 1. Findings of fluid overload with small volume ascites, mild periportal edema and mild gallbladder wall edema. 2. Otherwise no evidence of acute process in the abdomen or pelvis. US ABD LTD    Result Date: 7/11/2021  No findings of acute cholecystitis. Gallbladder wall and periportal edema were better seen on prior CT.     XR CHEST PORT    Result Date: 7/10/2021  No acute findings. Labs:     Recent Labs     07/11/21  1220 07/11/21  0114   WBC 2.5* 2.9*   HGB 10.3* 9.0*   HCT 30.4* 26.5*    150     Recent Labs     07/12/21  0430 07/11/21  0114 07/10/21  1542    133* 131*   K 3.5 3.1* 3.4*   * 103 96*   CO2 27 26 28   BUN 3* 5* 4*   CREA 0.59 0.52* 0.66   GLU 81 104* 92   CA 8.2* 7.5* 9.3   MG 2.2 1.7  --      Recent Labs     07/11/21  0114 07/10/21  1542   ALT 17 20   AP 36* 50   TBILI 0.2 0.3   TP 5.3* 7.1   ALB 2.8* 3.9   GLOB 2.5 3.2   LPSE  --  67*     No results for input(s): INR, PTP, APTT, INREXT, INREXT in the last 72 hours. No results for input(s): FE, TIBC, PSAT, FERR in the last 72 hours. No results found for: FOL, RBCF   No results for input(s): PH, PCO2, PO2 in the last 72 hours.   Recent Labs     07/12/21 0430 07/11/21  1220 07/11/21  0114   TROIQ 9.01* 6.10* 2.69*     Lab Results   Component Value Date/Time    Cholesterol, total 121 07/11/2021 01:14 AM    HDL Cholesterol 42 07/11/2021 01:14 AM    LDL, calculated 60.6 07/11/2021 01:14 AM    Triglyceride 92 07/11/2021 01:14 AM    CHOL/HDL Ratio 2.9 07/11/2021 01:14 AM     No results found for: [facility]  Lab Results   Component Value Date/Time    Color YELLOW/STRAW 07/10/2021 03:42 PM    Appearance CLEAR 07/10/2021 03:42 PM    Specific gravity 1.007 07/10/2021 03:42 PM    pH (UA) 6.0 07/10/2021 03:42 PM    Protein Negative 07/10/2021 03:42 PM    Glucose Negative 07/10/2021 03:42 PM    Ketone TRACE (A) 07/10/2021 03:42 PM    Bilirubin Negative 07/10/2021 03:42 PM    Urobilinogen 0.2 07/10/2021 03:42 PM    Nitrites Negative 07/10/2021 03:42 PM    Leukocyte Esterase Negative 07/10/2021 03:42 PM    Epithelial cells FEW 07/10/2021 03:42 PM    Bacteria Negative 07/10/2021 03:42 PM    WBC 0-4 07/10/2021 03:42 PM    RBC 0-5 07/10/2021 03:42 PM         Medications Reviewed:     Current Facility-Administered Medications   Medication Dose Route Frequency    0.9% sodium chloride infusion 500 mL  500 mL IntraVENous CONTINUOUS    sodium chloride (NS) flush 5-40 mL  5-40 mL IntraVENous Q8H    sodium chloride (NS) flush 5-40 mL  5-40 mL IntraVENous PRN    sodium chloride (NS) flush 5-40 mL  5-40 mL IntraVENous Q8H    sodium chloride (NS) flush 5-40 mL  5-40 mL IntraVENous PRN    nitroglycerin (NITROBID) 2 % ointment 1 Inch  1 Inch Topical Q6H    aspirin chewable tablet 81 mg  81 mg Oral DAILY    acetaminophen (TYLENOL) tablet 650 mg  650 mg Oral Q4H PRN    oxyCODONE IR (ROXICODONE) tablet 5 mg  5 mg Oral Q4H PRN    morphine injection 2 mg  2 mg IntraVENous Q4H PRN    pantoprazole (PROTONIX) 40 mg in 0.9% sodium chloride 10 mL injection  40 mg IntraVENous DAILY    0.9% sodium chloride infusion  125 mL/hr IntraVENous CONTINUOUS    ondansetron (ZOFRAN) injection 4 mg  4 mg IntraVENous Q8H PRN    [Held by provider] enoxaparin (LOVENOX) injection 70 mg  1 mg/kg SubCUTAneous Q12H    melatonin tablet 3 mg  3 mg Oral QHS PRN    simethicone (MYLICON) tablet 80 mg  80 mg Oral QID PRN     ______________________________________________________________________  EXPECTED LENGTH OF STAY: - - -  ACTUAL LENGTH OF STAY:          2                 Lacey London MD

## 2021-07-12 NOTE — PROGRESS NOTES
Cardiac Cath Lab Procedure Area Arrival Note:    Brandyn Pretty arrived to Cardiac Cath Lab, Procedure Area. Patient identifiers verified with NAME and DATE OF BIRTH. Procedure verified with patient. Consent forms verified. Allergies verified. Patient informed of procedure and plan of care. Questions answered with review. Patient voiced understanding of procedure and plan of care. Patient on cardiac monitor, non-invasive blood pressure, SPO2 monitor. On O2 @ 2 lpm via nc. IV of ns on pump at 125 ml/hr. Patient status doing well without problems. Patient is A&Ox 4. Patient reports no cp or sob. Patient medicated during procedure with orders obtained and verified by Dr. Mohamud Silva. Refer to patients Cardiac Cath Lab PROCEDURE REPORT for vital signs, assessment, status, and response during procedure, printed at end of case. Printed report on chart or scanned into chart.

## 2021-07-12 NOTE — PROGRESS NOTES
ID Progress Note  2021    Subjective:     States that she is feeling better today--no further loose stools. Troponin still climbing. Cardiac cath results noted. Objective:     Antibiotics:  1. None       Vitals:   Visit Vitals  /82   Pulse (!) 58   Temp 98 °F (36.7 °C)   Resp 18   Ht 5' 8\" (1.727 m)   Wt 72.8 kg (160 lb 7.9 oz)   SpO2 100%   BMI 24.40 kg/m²        Tmax:  Temp (24hrs), Av.2 °F (36.8 °C), Min:97.6 °F (36.4 °C), Max:98.6 °F (37 °C)      Exam:  Lungs:  clear to auscultation bilaterally  Heart:  regular rate and rhythm  Abdomen:  soft, non-tender. Bowel sounds normal. No masses,  no organomegaly  Skin:  no rash or abnormalities    Labs:      Recent Labs     21  0430 21  1220 21  0114 07/10/21  1542   WBC  --  2.5* 2.9* 4.0   HGB  --  10.3* 9.0* 11.9   PLT  --  167 150 216   BUN 3*  --  5* 4*   CREA 0.59  --  0.52* 0.66   AP  --   --  36* 50   TBILI  --   --  0.2 0.3       Cultures:     No results found for: SDES  No results found for: CULT    Radiology:     Line/Insert Date:           Assessment:     1. Leukopenia   2. Elevated cardiac enzymes  3. Wonder if she has myocarditis/viral syndrome    Objective:     1. Continue supportive care  2.  Work up any fevers    Denny Cespedes MD

## 2021-07-12 NOTE — PROCEDURES
Findings:  1) Normal LVEDP  2) no significant  coronary artery disease. No definitive evidence of abrupt vessel/branch truncation suggesting spontaneous coronary dissection      Contrast: cc  Access: right radial artery access    Recommendations  1) Treat as MINOCA. May consider doing  CMR as outpatient.

## 2021-07-13 ENCOUNTER — APPOINTMENT (OUTPATIENT)
Dept: NUCLEAR MEDICINE | Age: 56
DRG: 281 | End: 2021-07-13
Attending: INTERNAL MEDICINE
Payer: COMMERCIAL

## 2021-07-13 ENCOUNTER — TELEPHONE (OUTPATIENT)
Dept: CARDIOLOGY CLINIC | Age: 56
End: 2021-07-13

## 2021-07-13 VITALS
HEIGHT: 68 IN | WEIGHT: 160.5 LBS | DIASTOLIC BLOOD PRESSURE: 84 MMHG | TEMPERATURE: 98.2 F | SYSTOLIC BLOOD PRESSURE: 129 MMHG | RESPIRATION RATE: 16 BRPM | BODY MASS INDEX: 24.32 KG/M2 | HEART RATE: 59 BPM | OXYGEN SATURATION: 98 %

## 2021-07-13 DIAGNOSIS — I21.4 NON-ST ELEVATION MYOCARDIAL INFARCTION (NSTEMI) (HCC): Primary | ICD-10-CM

## 2021-07-13 LAB
ANION GAP SERPL CALC-SCNC: 3 MMOL/L (ref 5–15)
ATRIAL RATE: 61 BPM
ATRIAL RATE: 62 BPM
ATRIAL RATE: 62 BPM
BUN SERPL-MCNC: 4 MG/DL (ref 6–20)
BUN/CREAT SERPL: 7 (ref 12–20)
CALCIUM SERPL-MCNC: 8.3 MG/DL (ref 8.5–10.1)
CALCULATED P AXIS, ECG09: 52 DEGREES
CALCULATED P AXIS, ECG09: 56 DEGREES
CALCULATED P AXIS, ECG09: 58 DEGREES
CALCULATED R AXIS, ECG10: 12 DEGREES
CALCULATED R AXIS, ECG10: 15 DEGREES
CALCULATED R AXIS, ECG10: 19 DEGREES
CALCULATED T AXIS, ECG11: 19 DEGREES
CALCULATED T AXIS, ECG11: 23 DEGREES
CALCULATED T AXIS, ECG11: 6 DEGREES
CHLORIDE SERPL-SCNC: 111 MMOL/L (ref 97–108)
CO2 SERPL-SCNC: 27 MMOL/L (ref 21–32)
CREAT SERPL-MCNC: 0.59 MG/DL (ref 0.55–1.02)
DIAGNOSIS, 93000: NORMAL
ERYTHROCYTE [DISTWIDTH] IN BLOOD BY AUTOMATED COUNT: 11.7 % (ref 11.5–14.5)
GLUCOSE SERPL-MCNC: 81 MG/DL (ref 65–100)
HCT VFR BLD AUTO: 28.2 % (ref 35–47)
HGB BLD-MCNC: 9.5 G/DL (ref 11.5–16)
MCH RBC QN AUTO: 31.5 PG (ref 26–34)
MCHC RBC AUTO-ENTMCNC: 33.7 G/DL (ref 30–36.5)
MCV RBC AUTO: 93.4 FL (ref 80–99)
NRBC # BLD: 0 K/UL (ref 0–0.01)
NRBC BLD-RTO: 0 PER 100 WBC
P-R INTERVAL, ECG05: 144 MS
P-R INTERVAL, ECG05: 156 MS
P-R INTERVAL, ECG05: 156 MS
PLATELET # BLD AUTO: 188 K/UL (ref 150–400)
PMV BLD AUTO: 10.2 FL (ref 8.9–12.9)
POTASSIUM SERPL-SCNC: 3.4 MMOL/L (ref 3.5–5.1)
Q-T INTERVAL, ECG07: 382 MS
Q-T INTERVAL, ECG07: 384 MS
Q-T INTERVAL, ECG07: 384 MS
QRS DURATION, ECG06: 84 MS
QRS DURATION, ECG06: 88 MS
QRS DURATION, ECG06: 90 MS
QTC CALCULATION (BEZET), ECG08: 386 MS
QTC CALCULATION (BEZET), ECG08: 387 MS
QTC CALCULATION (BEZET), ECG08: 389 MS
RBC # BLD AUTO: 3.02 M/UL (ref 3.8–5.2)
SODIUM SERPL-SCNC: 141 MMOL/L (ref 136–145)
TROPONIN I SERPL-MCNC: 6 NG/ML
VENTRICULAR RATE, ECG03: 61 BPM
VENTRICULAR RATE, ECG03: 62 BPM
VENTRICULAR RATE, ECG03: 62 BPM
WBC # BLD AUTO: 4.4 K/UL (ref 3.6–11)

## 2021-07-13 PROCEDURE — 74011250637 HC RX REV CODE- 250/637: Performed by: INTERNAL MEDICINE

## 2021-07-13 PROCEDURE — C9113 INJ PANTOPRAZOLE SODIUM, VIA: HCPCS | Performed by: INTERNAL MEDICINE

## 2021-07-13 PROCEDURE — 84484 ASSAY OF TROPONIN QUANT: CPT

## 2021-07-13 PROCEDURE — 74011250636 HC RX REV CODE- 250/636: Performed by: INTERNAL MEDICINE

## 2021-07-13 PROCEDURE — 85027 COMPLETE CBC AUTOMATED: CPT

## 2021-07-13 PROCEDURE — 74011000250 HC RX REV CODE- 250: Performed by: INTERNAL MEDICINE

## 2021-07-13 PROCEDURE — A9537 TC99M MEBROFENIN: HCPCS

## 2021-07-13 PROCEDURE — 80048 BASIC METABOLIC PNL TOTAL CA: CPT

## 2021-07-13 PROCEDURE — 99233 SBSQ HOSP IP/OBS HIGH 50: CPT | Performed by: INTERNAL MEDICINE

## 2021-07-13 PROCEDURE — 36415 COLL VENOUS BLD VENIPUNCTURE: CPT

## 2021-07-13 RX ORDER — GUAIFENESIN 100 MG/5ML
81 LIQUID (ML) ORAL DAILY
Qty: 30 TABLET | Refills: 0 | Status: SHIPPED | OUTPATIENT
Start: 2021-07-14 | End: 2021-10-15 | Stop reason: ALTCHOICE

## 2021-07-13 RX ORDER — KIT FOR THE PREPARATION OF TECHNETIUM TC 99M MEBROFENIN 45 MG/10ML
5.4 INJECTION, POWDER, LYOPHILIZED, FOR SOLUTION INTRAVENOUS
Status: COMPLETED | OUTPATIENT
Start: 2021-07-13 | End: 2021-07-13

## 2021-07-13 RX ORDER — PANTOPRAZOLE SODIUM 40 MG/1
40 TABLET, DELAYED RELEASE ORAL
Qty: 10 TABLET | Refills: 0 | Status: SHIPPED | OUTPATIENT
Start: 2021-07-14 | End: 2021-10-15 | Stop reason: ALTCHOICE

## 2021-07-13 RX ORDER — PANTOPRAZOLE SODIUM 40 MG/1
40 TABLET, DELAYED RELEASE ORAL
Status: DISCONTINUED | OUTPATIENT
Start: 2021-07-14 | End: 2021-07-13 | Stop reason: HOSPADM

## 2021-07-13 RX ADMIN — SODIUM CHLORIDE 125 ML/HR: 9 INJECTION, SOLUTION INTRAVENOUS at 00:02

## 2021-07-13 RX ADMIN — Medication 10 ML: at 16:13

## 2021-07-13 RX ADMIN — ACETAMINOPHEN 650 MG: 325 TABLET ORAL at 07:34

## 2021-07-13 RX ADMIN — Medication 10 ML: at 07:35

## 2021-07-13 RX ADMIN — ASPIRIN 81 MG: 81 TABLET, CHEWABLE ORAL at 09:04

## 2021-07-13 RX ADMIN — ACETAMINOPHEN 650 MG: 325 TABLET ORAL at 12:07

## 2021-07-13 RX ADMIN — SODIUM CHLORIDE 40 MG: 9 INJECTION INTRAMUSCULAR; INTRAVENOUS; SUBCUTANEOUS at 09:06

## 2021-07-13 RX ADMIN — Medication 10 ML: at 16:12

## 2021-07-13 RX ADMIN — KIT FOR THE PREPARATION OF TECHNETIUM TC 99M MEBROFENIN 5.4 MILLICURIE: 45 INJECTION, POWDER, LYOPHILIZED, FOR SOLUTION INTRAVENOUS at 09:28

## 2021-07-13 NOTE — PROGRESS NOTES
1930:  Bedside shift change report given to [facility] (oncoming nurse) by Bren Morgan RN (offgoing nurse). Report included the following information SBAR, Kardex, ED Summary, Procedure Summary, Intake/Output, MAR, Recent Results and Cardiac Rhythm NSR-SB. '    0730: Bedside and Verbal shift change report given to [address] [place] [address] (oncoming nurse) by Mary RANDOLPH (offgoing nurse). Report included the following information SBAR, Kardex, ED Summary, Procedure Summary, Intake/Output, MAR, Recent Results and Cardiac Rhythm NSR-SB.

## 2021-07-13 NOTE — DISCHARGE SUMMARY
Discharge Summary       PATIENT ID: Cynthia David  MRN: [id number]   YOB: 1965    DATE OF ADMISSION: 7/10/2021  3:16 PM    DATE OF DISCHARGE: 07/13/21   PRIMARY CARE PROVIDER: None     ATTENDING PHYSICIAN: Leny Betancourt MD  DISCHARGING PROVIDER: Leny Betancourt MD    To contact this individual call [phone] and ask the  to page. If unavailable ask to be transferred the Adult Hospitalist Department. CONSULTATIONS: IP CONSULT TO HOSPITALIST  IP CONSULT TO CARDIOLOGY  IP CONSULT TO INFECTIOUS DISEASES    PROCEDURES/SURGERIES: Procedure(s):  LEFT HEART CATH / CORONARY ANGIOGRAPHY    ADMITTING [address] [place] [address] COURSE:   NSTEMI, POA  Diarrhea, POA  Hyponatremia and hypokalemia due to GI losses. Admission Summary:   Marissa Jimneez is a 49 y. o. female with no significant past medical history comes with complaints of chest pain and pressure along with diarrhea.   Patient is awake, alert and oriented able to answer my question follow my request.  Data also obtained of the ED staff and extensive chart review.  As per collective reports patient is a healthy lady who does not take any medications except for glucosamine and probiotics daily. Magno Martinez reports that she recently traveled back from [place] where she was a for vacation about a week ago and has been doing well but she developed diarrhea about 3 days ago. Alcides Shin is nonbloody, nonmucoid, watery yellowish color diarrhea which happens in episodes and these episodes are associated with crampy abdominal pain.  She also has lost appetite and has had poor oral intake in the last day and a half.  Patient did not have any nausea or vomiting until this morning where she had nausea without any vomiting.  She she also developed chest pains this morning.  The pain she describes is as of chest pressure, starting since 10th a.m. along with slight dizziness and burning in the chest.  She feels as if her heartburn has gotten worse. Kierra Santana also reports that whenever she eats chest passes right through her but she only had had 24 BM this morning.  She had a leftover [place] dinner on 7/7/2021. Kierra Santana also had fish tacos and lunch that day. Kierra Santana had no other sick contacts, no antibiotic use, no cough, fevers chills or rigors.  The chest pressure she described was as if somebody sitting on her chest and it lasted till 4 PM in the ED.  In the ED she was normotensive, hemodynamically stable but was dizzy so received a fluid bolus of 1 L which improved her symptoms significantly. [address] [place] [address] work revealed hypokalemia, hyponatremia and elevated troponin at 1.39.  EKG was negative for acute pathology.  She received aspirin and cardiology was consulted.  Cardiology recommended to get an echocardiogram and a Covid testing given diarrhea.  We will admit the patient for further evaluation and management. Chart reviewed at length.     Interval history / Subjective: Follow up chest pain/pressure feeling, diarrhea. Patient seen and examined at the bedside. Labs, images and notes reviewed  Discussed with nursing staff, orders reviewed. Plan discussed with patient/Family  Feeling okay. Denied any concerns. Troponin down to 6.00 from 9.01. No other concerns or overnight events. Okay from cardiology to discharge. Outpatient follow-up with cardiology with cardiac MRI. DISCHARGE DIAGNOSES / PLAN:      #NSTEMI, elevated troponins. Peaked at 9.01. [facility] WNL. Echocardiogram unremarkable. [facility] per cardiology. #Difficulty breathing on admission. VTE ruled out. No PE, no DVT on imaging. #Diarrhea, POA with some abdominal cramps, resolved.   #Hx of [employer] COVID-19 vaccine later half of the May 2021-2 weeks before 6/8/2021 per patient when she visited her family at [place]  #Dehydration, resolved  #Hypokalemia, likely due to GI loss, resolved  #Hyponatremia, likely due to GI loss and dehydration, resolved  #Hypomagnesemia, resolved  #Bradycardia, BB discontinued  #Gallbladder wall edema as noted on CT scan. RUQ ultrasound and HIDA scan unremarkable for any concerning GB findings and are within normal limits.     -Admitted to inpatient, [facility]  -Cardiology on board. Appreciate recommendations.  -Troponin down trended. Less likely myocarditis with quick improvement in troponin.  -Cardiology recommending outpatient cardiac MRI and follow-up with Dr. Celestine Samayoa subsequently  -ID on board. Appreciate recommendations  -Viral respiratory panel requested 7/11. Partial panel negative so far. -Echocardiogram noted 7/11/2021  -Aspirin 81 mg on discharge per cardiology. -DC IVF. -PPI for 10 more days. -RUQ US and HIDA scan for gallbladder wall edema found on CT scan noted to be unremarkable. -Advised patient to rest and stay off work until Monday per cardiology recommendations.     Echocardiogram 7/11/2021:  Final result ·   PV: Mild to moderate pulmonic valve regurgitation is present. · PA: Dilated pulmonary artery. · LV: Estimated LVEF is 50 - 55%. Normal cavity size, wall thickness and diastolic function. Low normal systolic function. Wall motion: normal. Normal left ventricular strain. · RA: Mildly dilated right atrium. · TV: Mild tricuspid valve regurgitation is present. Pulmonary hypertension not suggested by Doppler findings.      [facility] 7/12/2021:  Findings:  1) Normal LVEDP  2) no significant  coronary artery disease.  No definitive evidence of abrupt vessel/branch truncation suggesting spontaneous coronary dissection. Contrast: cc  Access: right radial artery access  Recommendations  1) evaluation for other causes of troponin elevation.  May consider evaluating for pulmonary embolism.  Alternative diagnoses may include stress cardiomyopathy although LV gram is not convincing for typical pattern.      HIDA scan 7/13/2021:  IMPRESSION  Normal gallbladder hepatobiliary imaging study.  Calculated ejection fraction between 0 and 60 minutes is 75 %     Code status: Full code  DVT prophylaxis: Lovenox full dose SQ twice daily     Care Plan discussed with: Patient/Family, Nurse and   Anticipated Disposition: Home w/Family  Anticipated Discharge: DC home today. ADDITIONAL CARE RECOMMENDATIONS:   Follow-up with PCP within 1 week for post hospital discharge follow-up. Follow-up with cardiology, Dr. Charles Naranjo in 6 weeks with cardiac MRI testing. Please do not resume your work for next 5 days, okay to resume on Monday as per cardiology recommendations. · It is important that you take the medication exactly as they are prescribed. · Keep your medication in the bottles provided by the pharmacist and keep a list of the medication names, dosages, and times to be taken in your wallet. · Do not take other medications without consulting your doctor. · No drinking alcohol or driving car or operating machinery if you are on narcotic pain medications. Donot take sedating mediations if you are sleepy or confused. · Fall Precautions  · Keep Well Hydrated  · Report to your medical provider if you feel you have  developed allergies to medications  · Follow up with your PCP or Consultant for medication adjustments and refills  · Monitor for signs of fevers,chills,bleeding,chest pain and seek medical attention if you do so.          DIET: Cardiac Diet     ACTIVITY: Activity as tolerated     WOUND CARE: N/A     EQUIPMENT needed: N/A       PENDING TEST RESULTS:   At the time of discharge the following test results are still pending: Respiratory viral panel, partial pending otherwise negative so far      FOLLOW UP APPOINTMENTS:    Follow-up Information     Follow up With Specialties Details Why Contact Info    Emma Urbina MD Cardiology In 6 weeks follow up after cardiac MRI.  [address] [facility]  [address]  [facility] [address] [address] [phone]      Primary care provider  In 1 week PCP: For post hospital discharge follow-up within 1 week with CBC, CMP monitoring.   Patient wants to go to her son's PCP near her home. DISCHARGE MEDICATIONS:  Current Discharge Medication List      START taking these medications    Details   aspirin 81 mg chewable tablet Take 1 Tablet by mouth daily. Qty: 30 Tablet, Refills: 0  Start date: 7/14/2021      pantoprazole (PROTONIX) 40 mg tablet Take 1 Tablet by mouth Daily (before breakfast). Qty: 10 Tablet, Refills: 0  Start date: 7/14/2021               NOTIFY YOUR PHYSICIAN FOR ANY OF THE FOLLOWING:   Fever over 101 degrees for 24 hours. Chest pain, shortness of breath, fever, chills, nausea, vomiting, diarrhea, change in mentation, falling, weakness, bleeding. Severe pain or pain not relieved by medications. Or, any other signs or symptoms that you may have questions about. DISPOSITION:  x  Home With:   OT  PT  HH  RN       Long term SNF/Inpatient Rehab    Independent/assisted living    Hospice    Other:       PATIENT CONDITION AT DISCHARGE:     Functional status    Poor     Deconditioned    x Independent      Cognition    x Lucid     Forgetful     Dementia      Catheters/lines (plus indication)    [place]     PICC     PEG    x None      Code status   x  Full code     DNR      PHYSICAL EXAMINATION AT DISCHARGE:  Visit Vitals  /69 (BP 1 Location: Left upper arm, BP Patient Position: At rest)   Pulse 64   Temp 98.1 °F (36.7 °C)   Resp 18   Ht 5' 8\" (1.727 m)   Wt 72.8 kg (160 lb 7.9 oz)   SpO2 99%   BMI 24.40 kg/m²       I had a face to face encounter with this patient and independently examined them on 7/13/2021 as outlined below: As noted below and unchanged from previous day                                                   Constitutional:  No acute distress, cooperative, pleasant. Alert, O x3   ENT:  Oral mucosa moist, oropharynx benign. Resp:  CTA bilaterally. No wheezing/rhonchi/rales. No accessory muscle use   CV:  Regular rhythm, normal rate, no murmurs, gallops, rubs    GI:  Soft, non distended, non tender. normoactive bowel sounds, no hepatosplenomegaly     Musculoskeletal:  No edema, warm, 2+ pulses throughout    Neurologic:  Moves all extremities. AAOx3, CN II-XII reviewed           CHRONIC MEDICAL DIAGNOSES:  Problem List as of 7/13/2021 Date Reviewed: 7/11/2021        Codes Class Noted - Resolved    Diarrhea of presumed infectious origin ICD-10-CM: R19.7  ICD-9-CM: 009.3  7/11/2021 - Present        NSTEMI (non-ST elevated myocardial infarction) [facility]) ICD-10-CM: I21.4  ICD-9-CM: 410.70  7/10/2021 - Present            Radiology  NM HEPATOBILIARY DUCT SCAN    Result Date: 7/13/2021  Normal gallbladder hepatobiliary imaging study. CTA CHEST W OR W WO CONT    Result Date: 7/11/2021  Chest: 1. No evidence of pulmonary embolism. 2. Trace bilateral pleural effusions and mild bilateral lower lobe subsegmental atelectasis. Abdomen/pelvis: 1. Findings of fluid overload with small volume ascites, mild periportal edema and mild gallbladder wall edema. 2. Otherwise no evidence of acute process in the abdomen or pelvis. CT ABD PELV W CONT    Result Date: 7/11/2021  Chest: 1. No evidence of pulmonary embolism. 2. Trace bilateral pleural effusions and mild bilateral lower lobe subsegmental atelectasis. Abdomen/pelvis: 1. Findings of fluid overload with small volume ascites, mild periportal edema and mild gallbladder wall edema. 2. Otherwise no evidence of acute process in the abdomen or pelvis. US ABD LTD    Result Date: 7/11/2021  No findings of acute cholecystitis. Gallbladder wall and periportal edema were better seen on prior CT. XR CHEST PORT    Result Date: 7/10/2021  No acute findings.     Laboratory data:  Recent Results (from the past 24 hour(s))   METABOLIC PANEL, BASIC    Collection Time: 07/13/21  3:20 AM   Result Value Ref Range    Sodium 141 136 - 145 mmol/L    Potassium 3.4 (L) 3.5 - 5.1 mmol/L    Chloride 111 (H) 97 - 108 mmol/L    CO2 27 21 - 32 mmol/L    Anion gap 3 (L) 5 - 15 mmol/L    Glucose 81 65 - 100 mg/dL    BUN 4 (L) 6 - 20 MG/DL    Creatinine 0.59 0.55 - 1.02 MG/DL    BUN/Creatinine ratio 7 (L) 12 - 20      GFR est AA >60 >60 ml/min/1.73m2    GFR est non-AA >60 >60 ml/min/1.73m2    Calcium 8.3 (L) 8.5 - 10.1 MG/DL   TROPONIN I    Collection Time: 07/13/21  3:20 AM   Result Value Ref Range    Troponin-I, Qt. 6.00 (H) <0.05 ng/mL   CBC W/O DIFF    Collection Time: 07/13/21  3:20 AM   Result Value Ref Range    WBC 4.4 3.6 - 11.0 K/uL    RBC 3.02 (L) 3.80 - 5.20 M/uL    HGB 9.5 (L) 11.5 - 16.0 g/dL    HCT 28.2 (L) 35.0 - 47.0 %    MCV 93.4 80.0 - 99.0 FL    MCH 31.5 26.0 - 34.0 PG    MCHC 33.7 30.0 - 36.5 g/dL    RDW 11.7 11.5 - 14.5 %    PLATELET 047 968 - 058 K/uL    MPV 10.2 8.9 - 12.9 FL    NRBC 0.0 0  WBC    ABSOLUTE NRBC 0.00 0.00 - 0.01 K/uL       Greater than 40 minutes were spent with the patient on counseling and coordination of care    Signed:   Lilian Ding MD  7/13/2021  3:13 PM

## 2021-07-13 NOTE — PROGRESS NOTES
ID Progress Note  2021    Subjective:     States that she is feeling better today--no further loose stools. Troponin dropping. Cardiac cath results noted. Objective:     Antibiotics:  1. None       Vitals:   Visit Vitals  /69 (BP 1 Location: Left upper arm, BP Patient Position: At rest)   Pulse 64   Temp 98.1 °F (36.7 °C)   Resp 18   Ht 5' 8\" (1.727 m)   Wt 72.8 kg (160 lb 7.9 oz)   SpO2 99%   BMI 24.40 kg/m²        Tmax:  Temp (24hrs), Av.2 °F (36.8 °C), Min:97.9 °F (36.6 °C), Max:98.5 °F (36.9 °C)      Exam:  Lungs:  clear to auscultation bilaterally  Heart:  regular rate and rhythm  Abdomen:  soft, non-tender. Bowel sounds normal. No masses,  no organomegaly  Skin:  no rash or abnormalities    Labs:      Recent Labs     21  0320 21  0430 21  1220 21  0114 07/10/21  1542   WBC 4.4  --  2.5* 2.9* 4.0   HGB 9.5*  --  10.3* 9.0* 11.9     --  167 150 216   BUN 4* 3*  --  5* 4*   CREA 0.59 0.59  --  0.52* 0.66   AP  --   --   --  36* 50   TBILI  --   --   --  0.2 0.3       Cultures:     No results found for: SDES  No results found for: CULT    Radiology:     Line/Insert Date:           Assessment:     1. Leukopenia--resolving  2. Elevated cardiac enzymes  3. Wonder if she has myocarditis/viral syndrome    Objective:     1. Continue supportive care  2. Work up any fevers  3.  Home soon, it appears    Lori Bonds MD

## 2021-07-13 NOTE — PROGRESS NOTES
0730: Bedside shift change report given to Wilver Lomax (oncoming nurse) by Mary (offgoing nurse). Report included the following information SBAR, Kardex, ED Summary, Procedure Summary, Intake/Output, MAR and Recent Results. 1648: PT off floor to nuc med without RN and tele per MD order. 1203: Pt returned from nuc med and placed on tele. Confirmed with monitor tech. 1644: Pt off tele and IV's removed for discharge. I have reviewed discharge instructions with the patient. The patient verbalized understanding. Discharge medications reviewed with patient and appropriate educational materials and side effects teaching were provided. Current Discharge Medication List      START taking these medications    Details   aspirin 81 mg chewable tablet Take 1 Tablet by mouth daily. Qty: 30 Tablet, Refills: 0  Start date: 7/14/2021      pantoprazole (PROTONIX) 40 mg tablet Take 1 Tablet by mouth Daily (before breakfast).   Qty: 10 Tablet, Refills: 0  Start date: 7/14/2021

## 2021-07-13 NOTE — TELEPHONE ENCOUNTER
----- Message from Alexander David. JAYSON Guy sent at 7/13/2021  1:23 PM EDT -----  Needs cardiac MRI and follow up with Trevin after cardiac MRI. Please call pt with her appointment.      Order has been placed for MRI     Central Scheduling for test being completed at the hospital.     Phone # [phone]

## 2021-07-13 NOTE — PROGRESS NOTES
[facility] [facility] Adult  Hospitalist Group                                                                                          Hospitalist Progress Note  Camilla Llanes MD  Answering service: [phone] from in house phone        Date of Service:  2021  NAME:  Magalis Santana  :  1965  MRN:  [id number]      Admission Summary:   Magalis Santana is a 49 y.o. female with no significant past medical history comes with complaints of chest pain and pressure along with diarrhea. Patient is awake, alert and oriented able to answer my question follow my request.  Data also obtained of the ED staff and extensive chart review. As per collective reports patient is a healthy lady who does not take any medications except for glucosamine and probiotics daily. Patient reports that she recently traveled back from [place] where she was a for vacation about a week ago and has been doing well but she developed diarrhea about 3 days ago. Diarrhea is nonbloody, nonmucoid, watery yellowish color diarrhea which happens in episodes and these episodes are associated with crampy abdominal pain. She also has lost appetite and has had poor oral intake in the last day and a half. Patient did not have any nausea or vomiting until this morning where she had nausea without any vomiting. She she also developed chest pains this morning. The pain she describes is as of chest pressure, starting since 10th a.m. along with slight dizziness and burning in the chest.  She feels as if her heartburn has gotten worse. She also reports that whenever she eats chest passes right through her but she only had had 24 BM this morning. She had a leftover [place] dinner on 2021. She also had fish tacos and lunch that day. She had no other sick contacts, no antibiotic use, no cough, fevers chills or rigors.   The chest pressure she described was as if somebody sitting on her chest and it lasted till 4 PM in the ED. In the ED she was normotensive, hemodynamically stable but was dizzy so received a fluid bolus of 1 L which improved her symptoms significantly. Blood work revealed hypokalemia, hyponatremia and elevated troponin at 1.39. EKG was negative for acute pathology. She received aspirin and cardiology was consulted. Cardiology recommended to get an echocardiogram and a Covid testing given diarrhea. We will admit the patient for further evaluation and management. Chart reviewed at length. Interval history / Subjective: Follow up chest pain/pressure feeling, diarrhea. Patient seen and examined at the bedside. Labs, images and notes reviewed  Discussed with nursing staff, orders reviewed. Plan discussed with patient/Family  Feeling okay. Denied any concerns. Troponin down to 6.00 from 9.01. No other concerns or overnight events. Okay from cardiology to discharge. Outpatient follow-up with cardiology with cardiac MRI. Assessment & Plan:     #NSTEMI, elevated troponins. Peaked at 9.01. [facility] WNL. Echocardiogram unremarkable. [facility] per cardiology. #Difficulty breathing on admission. VTE ruled out. No PE, no DVT on imaging. #Diarrhea, POA with some abdominal cramps, resolved. #Hx of J[employer] COVID-19 vaccine later half of the May 2021-2 weeks before 6/8/2021 per patient when she visited her family at [place]  #Dehydration, resolved  #Hypokalemia, likely due to GI loss, resolved  #Hyponatremia, likely due to GI loss and dehydration, resolved  #Hypomagnesemia, resolved  #Bradycardia, BB discontinued  #Gallbladder wall edema as noted on CT scan. RUQ ultrasound and HIDA scan unremarkable for any concerning GB findings and are within normal limits. -Admitted to inpatient, IMCU  -Cardiology on board. Appreciate recommendations.  -Troponin down trended.   Less likely myocarditis with quick improvement in troponin.  -Cardiology recommending outpatient cardiac MRI and follow-up with Dr. Becky Mayer subsequently  -ID on board. Appreciate recommendations  -Viral respiratory panel requested 7/11. Partial panel negative so far. -Echocardiogram noted 7/11/2021  -Aspirin 81 mg on discharge per cardiology. -DC IVF. -PPI for 10 more days. -RUQ US and HIDA scan for gallbladder wall edema found on CT scan noted to be unremarkable. -Advised patient to rest and stay off work until Monday per cardiology recommendations. Echocardiogram 7/11/2021:  Final result ·   PV: Mild to moderate pulmonic valve regurgitation is present. · PA: Dilated pulmonary artery. · LV: Estimated LVEF is 50 - 55%. Normal cavity size, wall thickness and diastolic function. Low normal systolic function. Wall motion: normal. Normal left ventricular strain. · RA: Mildly dilated right atrium. · TV: Mild tricuspid valve regurgitation is present. Pulmonary hypertension not suggested by Doppler findings. [facility] 7/12/2021:  Findings:  1) Normal LVEDP  2) no significant  coronary artery disease. No definitive evidence of abrupt vessel/branch truncation suggesting spontaneous coronary dissection. Contrast: cc  Access: right radial artery access  Recommendations  1) evaluation for other causes of troponin elevation. May consider evaluating for pulmonary embolism. Alternative diagnoses may include stress cardiomyopathy although LV gram is not convincing for typical pattern. HIDA scan 7/13/2021:  IMPRESSION  Normal gallbladder hepatobiliary imaging study. Calculated ejection fraction between 0 and 60 minutes is 75 %    Code status: Full code  DVT prophylaxis: Lovenox full dose SQ twice daily    Care Plan discussed with: Patient/Family, Nurse and   Anticipated Disposition: Home w/Family  Anticipated Discharge: DC home today.      Hospital Problems  Date Reviewed: 7/11/2021        Codes Class Noted POA    Diarrhea of presumed infectious origin ICD-10-CM: R19.7  ICD-9-CM: 009.3  7/11/2021 Unknown        NSTEMI (non-ST elevated myocardial infarction) [facility]) ICD-10-CM: I21.4  ICD-9-CM: 410.70  7/10/2021 Unknown                Review of Systems:   A comprehensive review of systems was negative except for that written in the HPI. Vital Signs:    Last 24hrs VS reviewed since prior progress note. Most recent are:  Visit Vitals  /69 (BP 1 Location: Left upper arm, BP Patient Position: At rest)   Pulse 64   Temp 98.1 °F (36.7 °C)   Resp 18   Ht 5' 8\" (1.727 m)   Wt 72.8 kg (160 lb 7.9 oz)   SpO2 99%   BMI 24.40 kg/m²         Intake/Output Summary (Last 24 hours) at 7/13/2021 1453  Last data filed at 7/13/2021 1421  Gross per 24 hour   Intake 2315 ml   Output 3180 ml   Net -865 ml        Physical Examination:     I had a face to face encounter with this patient and independently examined them on 7/13/2021 as outlined below: As noted below and unchanged from previous day          Constitutional:  No acute distress, cooperative, pleasant. Alert, O x3   ENT:  Oral mucosa moist, oropharynx benign. Resp:  CTA bilaterally. No wheezing/rhonchi/rales. No accessory muscle use   CV:  Regular rhythm, normal rate, no murmurs, gallops, rubs    GI:  Soft, non distended, non tender. normoactive bowel sounds, no hepatosplenomegaly     Musculoskeletal:  No edema, warm, 2+ pulses throughout    Neurologic:  Moves all extremities. AAOx3, CN II-XII reviewed            Data Review:    Review and/or order of clinical lab test  Review and/or order of tests in the radiology section of CPT  Review and/or order of tests in the medicine section of [facility]    NM HEPATOBILIARY DUCT SCAN    Result Date: 7/13/2021  Normal gallbladder hepatobiliary imaging study. CTA CHEST W OR W WO CONT    Result Date: 7/11/2021  Chest: 1. No evidence of pulmonary embolism. 2. Trace bilateral pleural effusions and mild bilateral lower lobe subsegmental atelectasis. Abdomen/pelvis: 1.  Findings of fluid overload with small volume ascites, mild periportal edema and mild gallbladder wall edema. 2. Otherwise no evidence of acute process in the abdomen or pelvis. CT ABD PELV W CONT    Result Date: 7/11/2021  Chest: 1. No evidence of pulmonary embolism. 2. Trace bilateral pleural effusions and mild bilateral lower lobe subsegmental atelectasis. Abdomen/pelvis: 1. Findings of fluid overload with small volume ascites, mild periportal edema and mild gallbladder wall edema. 2. Otherwise no evidence of acute process in the abdomen or pelvis. US ABD LTD    Result Date: 7/11/2021  No findings of acute cholecystitis. Gallbladder wall and periportal edema were better seen on prior CT. XR CHEST PORT    Result Date: 7/10/2021  No acute findings. Labs:     Recent Labs     07/13/21 0320 07/11/21  1220   WBC 4.4 2.5*   HGB 9.5* 10.3*   HCT 28.2* 30.4*    167     Recent Labs     07/13/21 0320 07/12/21  0430 07/11/21  0114    142 133*   K 3.4* 3.5 3.1*   * 110* 103   CO2 27 27 26   BUN 4* 3* 5*   CREA 0.59 0.59 0.52*   GLU 81 81 104*   CA 8.3* 8.2* 7.5*   MG  --  2.2 1.7     Recent Labs     07/11/21  0114 07/10/21  1542   ALT 17 20   AP 36* 50   TBILI 0.2 0.3   TP 5.3* 7.1   ALB 2.8* 3.9   GLOB 2.5 3.2   LPSE  --  67*     No results for input(s): INR, PTP, APTT, INREXT, INREXT in the last 72 hours. No results for input(s): FE, TIBC, PSAT, FERR in the last 72 hours. No results found for: FOL, RBCF   No results for input(s): PH, PCO2, PO2 in the last 72 hours.   Recent Labs     07/13/21  0320 07/12/21  0430 07/11/21  1220   TROIQ 6.00* 9.01* 6.10*     Lab Results   Component Value Date/Time    Cholesterol, total 121 07/11/2021 01:14 AM    HDL Cholesterol 42 07/11/2021 01:14 AM    LDL, calculated 60.6 07/11/2021 01:14 AM    Triglyceride 92 07/11/2021 01:14 AM    CHOL/HDL Ratio 2.9 07/11/2021 01:14 AM     No results found for: [facility]  Lab Results   Component Value Date/Time    Color YELLOW/STRAW 07/10/2021 03:42 PM    Appearance CLEAR 07/10/2021 03:42 PM    Specific gravity 1.007 07/10/2021 03:42 PM    pH (UA) 6.0 07/10/2021 03:42 PM    Protein Negative 07/10/2021 03:42 PM    Glucose Negative 07/10/2021 03:42 PM    Ketone TRACE (A) 07/10/2021 03:42 PM    Bilirubin Negative 07/10/2021 03:42 PM    Urobilinogen 0.2 07/10/2021 03:42 PM    Nitrites Negative 07/10/2021 03:42 PM    Leukocyte Esterase Negative 07/10/2021 03:42 PM    Epithelial cells FEW 07/10/2021 03:42 PM    Bacteria Negative 07/10/2021 03:42 PM    WBC 0-4 07/10/2021 03:42 PM    RBC 0-5 07/10/2021 03:42 PM         Medications Reviewed:     Current Facility-Administered Medications   Medication Dose Route Frequency    [START ON 7/14/2021] pantoprazole (PROTONIX) tablet 40 mg  40 mg Oral ACB    0.9% sodium chloride infusion 500 mL  500 mL IntraVENous CONTINUOUS    sodium chloride (NS) flush 5-40 mL  5-40 mL IntraVENous Q8H    sodium chloride (NS) flush 5-40 mL  5-40 mL IntraVENous PRN    sodium chloride (NS) flush 5-40 mL  5-40 mL IntraVENous Q8H    sodium chloride (NS) flush 5-40 mL  5-40 mL IntraVENous PRN    aspirin chewable tablet 81 mg  81 mg Oral DAILY    acetaminophen (TYLENOL) tablet 650 mg  650 mg Oral Q4H PRN    oxyCODONE IR (ROXICODONE) tablet 5 mg  5 mg Oral Q4H PRN    morphine injection 2 mg  2 mg IntraVENous Q4H PRN    ondansetron (ZOFRAN) injection 4 mg  4 mg IntraVENous Q8H PRN    melatonin tablet 3 mg  3 mg Oral QHS PRN    simethicone (MYLICON) tablet 80 mg  80 mg Oral QID PRN     ______________________________________________________________________  EXPECTED LENGTH OF STAY: 2d 12h  ACTUAL LENGTH OF STAY:          3                 Ana Laura Ordoñez MD

## 2021-07-13 NOTE — PROGRESS NOTES
Cardiology Progress Note           7/10/2021  3:16 PM   NSTEMI (non-ST elevated myocardial infarction) ([facility] [address] [I21.4]          Assessment and PLAN     1. Elevated troponin/NSTEMI/MINOCA(myocardial infarction with normal coronaries)  2. Chest pain, intermittent,resolved  3. Anemia, normocytic  4. Leukopenia- resolved. 5. Anemia-  6. Diarrhea on admission  7. Sinus bradycardia    49 y.o. female who presents with intermittent nonexertional chest pain and markedly elevated troponin . EKG shows no ischemic changes. Echo showed preserved EF, NWMA. She underwent LHC which showed no CAD and no evidence of spontaneous coronary dissection. We will treat as MINOCA. Continue ASA 81 mg po daily. She will need a cardiac MRI as outpatient. She will follow up with me after cardiac MRI. Office will call her with appointment for MRI and follow up. She is ok for discharge. Ok to return to work on Monday. Investigation  Telemetry: NSR  ECG: NSR, no ischemic findings  Echocardiogram: EF 55%, NWMA, mild TR. No pulmonary   07/10/21  []    High complexity decision making was performed   []    Patient is at high-risk of decompensation with multiple organ involvement     Review of Symptoms:  Respiratory: No exertional dyspnea, orthopnea, PND, cough, hemoptysis, URI. Cardiovascular: +intermittent CP, no palpitations, sweating, lightheadedness, dizziness, syncope, presyncope, lower extremity swelling. Otherwise no other pertinent positive or negative symptoms on ROS.      Patient Active Problem List    Diagnosis Date Noted    Diarrhea of presumed infectious origin 07/11/2021    [facility]non[facility]ST [facility] 07/10/2021      None  Past Medical History:   Diagnosis Date    Cervical spondylosis with radiculopathy       Past Surgical History:   Procedure Laterality Date    HX GYN  2001    cervical conization      Social History     Socioeconomic History    Marital status:      Spouse name: Not on file    Number of children: Not on file    Years of education: Not on file    Highest education level: Not on file   Tobacco Use    Smoking status: Current Some Day Smoker    Smokeless tobacco: Never Used   Substance and Sexual Activity    Alcohol use: Yes    Drug use: Never     Social Determinants of Health     Financial Resource Strain:     Difficulty of Paying Living Expenses:    Food Insecurity:     Worried About Running Out of Food in the Last Year:     [address] in the Last Year:    Transportation Needs:     Lack of Transportation (Medical):  Lack of Transportation (Non-Medical):    Physical Activity:     Days of Exercise per Week:     Minutes of Exercise per Session:    Stress:     Feeling of Stress :    Social Connections:     Frequency of Communication with Friends and Family:     Frequency of Social Gatherings with Friends and Family:     Attends Taoist Services:     Active Member of Clubs or Organizations:     Attends Club or Organization Meetings:     Marital Status:      History reviewed. No pertinent family history.    Current Facility-Administered Medications   Medication Dose Route Frequency    0.9% sodium chloride infusion 500 mL  500 mL IntraVENous CONTINUOUS    sodium chloride (NS) flush 5-40 mL  5-40 mL IntraVENous Q8H    sodium chloride (NS) flush 5-40 mL  5-40 mL IntraVENous PRN    sodium chloride (NS) flush 5-40 mL  5-40 mL IntraVENous Q8H    sodium chloride (NS) flush 5-40 mL  5-40 mL IntraVENous PRN    nitroglycerin (NITROBID) 2 % ointment 1 Inch  1 Inch Topical Q6H    aspirin chewable tablet 81 mg  81 mg Oral DAILY    acetaminophen (TYLENOL) tablet 650 mg  650 mg Oral Q4H PRN    oxyCODONE IR (ROXICODONE) tablet 5 mg  5 mg Oral Q4H PRN    morphine injection 2 mg  2 mg IntraVENous Q4H PRN    pantoprazole (PROTONIX) 40 mg in 0.9% sodium chloride 10 mL injection  40 mg IntraVENous DAILY    ondansetron (ZOFRAN) injection 4 mg  4 mg IntraVENous Q8H PRN    [Held by provider] enoxaparin (LOVENOX) injection 70 mg  1 mg/kg SubCUTAneous Q12H    melatonin tablet 3 mg  3 mg Oral QHS PRN    simethicone (MYLICON) tablet 80 mg  80 mg Oral QID PRN      None      No Known Allergies    Labs:   Recent Results (from the past 24 hour(s))   METABOLIC PANEL, BASIC    Collection Time: 07/13/21  3:20 AM   Result Value Ref Range    Sodium 141 136 - 145 mmol/L    Potassium 3.4 (L) 3.5 - 5.1 mmol/L    Chloride 111 (H) 97 - 108 mmol/L    CO2 27 21 - 32 mmol/L    Anion gap 3 (L) 5 - 15 mmol/L    Glucose 81 65 - 100 mg/dL    BUN 4 (L) 6 - 20 MG/DL    Creatinine 0.59 0.55 - 1.02 MG/DL    BUN/Creatinine ratio 7 (L) 12 - 20      GFR est AA >60 >60 ml/min/1.73m2    GFR est non-AA >60 >60 ml/min/1.73m2    Calcium 8.3 (L) 8.5 - 10.1 MG/DL   TROPONIN I    Collection Time: 07/13/21  3:20 AM   Result Value Ref Range    Troponin-I, Qt. 6.00 (H) <0.05 ng/mL   CBC W/O DIFF    Collection Time: 07/13/21  3:20 AM   Result Value Ref Range    WBC 4.4 3.6 - 11.0 K/uL    RBC 3.02 (L) 3.80 - 5.20 M/uL    HGB 9.5 (L) 11.5 - 16.0 g/dL    HCT 28.2 (L) 35.0 - 47.0 %    MCV 93.4 80.0 - 99.0 FL    MCH 31.5 26.0 - 34.0 PG    MCHC 33.7 30.0 - 36.5 g/dL    RDW 11.7 11.5 - 14.5 %    PLATELET 153 564 - 213 K/uL    MPV 10.2 8.9 - 12.9 FL    NRBC 0.0 0  WBC    ABSOLUTE NRBC 0.00 0.00 - 0.01 K/uL       Intake/Output Summary (Last 24 hours) at 7/13/2021 0923  Last data filed at 7/13/2021 0913  Gross per 24 hour   Intake 2315 ml   Output 3480 ml   Net -1165 ml      Patient Vitals for the past 24 hrs:   Temp Pulse Resp BP SpO2   07/13/21 0742 98.3 °F (36.8 °C) 62 18 139/89 99 %   07/13/21 0309 97.9 °F (36.6 °C) 66 18 116/78 97 %   07/12/21 2356 98.5 °F (36.9 °C) (!) 57 16 117/73 99 %   07/12/21 1949 98.4 °F (36.9 °C) (!) 58 16 133/81 97 %   07/12/21 1800  63 18  98 %   07/12/21 1700  61 18 119/78 97 %   07/12/21 1540 98 °F (36.7 °C) (!) 58 18 122/82 100 %   07/12/21 1518 98.2 °F (36.8 °C) 63 15 101/71 100 %   07/12/21 1510  (!) 53 15 110/69 95 %   07/12/21 1506  (!) 56 16 104/68 97 %   07/12/21 1455  (!) 53 12 111/73 92 %   07/12/21 1440  (!) 58 14 122/75 98 %   07/12/21 1200 98.4 °F (36.9 °C) (!) 57 19 133/89 97 %      General:    Alert, cooperative, no distress. Psychiatric:    Normal Mood and affect    Eye/ENT:      Pupils equal, No asymmetry, Conjunctival pink. Able to hear voice at normal amplitude   Lungs:      Visibly symmetric chest expansion, No palpable tenderness. Clear to auscultation bilaterally. Heart[de-identified]    Regular rate and rhythm, S1, S2 normal, no murmur, click, rub or gallop. No JVD, Normal palpable peripheral pulses. No cyanosis   Abdomen:     Soft, non-tender. Bowel sounds normal. No masses,  No      organomegaly. Extremities:   Extremities normal, atraumatic, no edema. rt radial access site with no hematoma or active bleeding. Neurologic:   CN II-XII grossly intact. No gross focal deficits         Shala Knott.  JAYSON Guy  7/13/2021   1:14 PM     [facility] Office:   [facility] Office:  [address] [place] [address]     [address] [place] [address] [address]     [address] [place] [address] N[place]  P: [phone]    P: [phone]  F: [phone]    F: [phone]

## 2021-07-13 NOTE — DISCHARGE INSTRUCTIONS
Discharge Instructions       PATIENT ID: Mayito Gray  MRN: [id number]   YOB: 1965    DATE OF ADMISSION: 7/10/2021  3:16 PM    DATE OF DISCHARGE: 7/13/2021    PRIMARY CARE PROVIDER: None     ATTENDING PHYSICIAN: Sherin Whitley MD  DISCHARGING PROVIDER: Es Vásquez MD    To contact this individual call [phone] and ask the  to page. If unavailable ask to be transferred the Adult Hospitalist Department. DISCHARGE DIAGNOSES   #NSTEMI, elevated troponins. Peaked at 9.01. [facility] WNL. Echocardiogram unremarkable. [facility] per cardiology. #Difficulty breathing on admission. VTE ruled out. No PE, no DVT on imaging. #Diarrhea, POA with some abdominal cramps, resolved. #Hx of J&J COVID-19 vaccine later half of the May 2021-2 weeks before 6/8/2021 per patient when she [place]  #Dehydration, resolved  #Hypokalemia, likely due to GI loss, resolved  #Hyponatremia, likely due to GI loss and dehydration, resolved  #Hypomagnesemia, resolved  #Bradycardia, BB discontinued  #Gallbladder wall edema as noted on CT scan. RUQ ultrasound and HIDA scan unremarkable for any concerning GB findings and are within normal limits. -Admitted to inpatient, IMCU  -Cardiology on board. Appreciate recommendations.  -Troponin down trended. Less likely myocarditis with quick improvement in troponin.  -Cardiology recommending outpatient cardiac MRI and follow-up with Dr. Suzanne Sweet subsequently  -ID on board. Appreciate recommendations  -Viral respiratory panel requested 7/11. Partial panel negative so far. -Echocardiogram noted 7/11/2021  -Aspirin 81 mg on discharge per cardiology. -DC IVF. DC PPI. -RUQ US and HIDA scan for gallbladder wall edema found on CT scan noted to be unremarkable. -Advised patient to rest and stay off work until Monday per cardiology recommendations.     Echocardiogram 7/11/2021:  Final result   · PV: Mild to moderate pulmonic valve regurgitation is present. · PA: Dilated pulmonary artery. · LV: Estimated LVEF is 50 - 55%. Normal cavity size, wall thickness and diastolic function. Low normal systolic function. Wall motion: normal. Normal left ventricular strain. · RA: Mildly dilated right atrium. · TV: Mild tricuspid valve regurgitation is present. Pulmonary hypertension not suggested by Doppler findings. [facility] 7/12/2021:  Findings:  1) Normal LVEDP  2) no significant  coronary artery disease. No definitive evidence of abrupt vessel/branch truncation suggesting spontaneous coronary dissection. Contrast: cc  Access: right radial artery access  Recommendations  1) evaluation for other causes of troponin elevation. May consider evaluating for pulmonary embolism. Alternative diagnoses may include stress cardiomyopathy although LV gram is not convincing for typical pattern. HIDA scan 7/13/2021:  IMPRESSION  Normal gallbladder hepatobiliary imaging study. Calculated ejection fraction between 0 and 60 minutes is 75 %    Code status: Full code  DVT prophylaxis: Lovenox full dose SQ twice daily    Care Plan discussed with: Patient/Family, Nurse and   Anticipated Disposition: Home w/Family  Anticipated Discharge: DC home today. CONSULTATIONS: IP CONSULT TO HOSPITALIST  IP CONSULT TO CARDIOLOGY  IP CONSULT TO INFECTIOUS DISEASES    PROCEDURES/SURGERIES: Procedure(s):  LEFT HEART CATH / CORONARY ANGIOGRAPHY    PENDING TEST RESULTS:   At the time of discharge the following test results are still pending: Respiratory viral panel, partial pending otherwise negative so far    FOLLOW UP APPOINTMENTS:   Follow-up Information     Follow up With Specialties Details Why Contact Info    Katie Deras MD Cardiology In 6 weeks follow up after cardiac MRI.  [address] [facility]  [address] [place] [address]  [phone]      Primary care provider  In 1 week PCP: For post hospital discharge follow-up within 1 week with CBC, CMP monitoring.   Patient wants to go to her son's PCP near her home. ADDITIONAL CARE RECOMMENDATIONS:   Follow-up with PCP within 1 week for post hospital discharge follow-up. Follow-up with cardiology, Dr. Jono Vega in 6 weeks with cardiac MRI testing. Please do not resume your work for next 5 days, okay to resume on Monday as per cardiology recommendations. · It is important that you take the medication exactly as they are prescribed. · Keep your medication in the bottles provided by the pharmacist and keep a list of the medication names, dosages, and times to be taken in your wallet. · Do not take other medications without consulting your doctor. · No drinking alcohol or driving car or operating machinery if you are on narcotic pain medications. Donot take sedating mediations if you are sleepy or confused. · Fall Precautions  · Keep Well Hydrated  · Report to your medical provider if you feel you have  developed allergies to medications  · Follow up with your PCP or Consultant for medication adjustments and refills  · Monitor for signs of fevers,chills,bleeding,chest pain and seek medical attention if you do so. DIET: Cardiac Diet    ACTIVITY: Activity as tolerated    WOUND CARE: N/A    EQUIPMENT needed: N/A      Radiology:  NM HEPATOBILIARY DUCT SCAN    Result Date: 7/13/2021  Normal gallbladder hepatobiliary imaging study. CTA CHEST W OR W WO CONT    Result Date: 7/11/2021  Chest: 1. No evidence of pulmonary embolism. 2. Trace bilateral pleural effusions and mild bilateral lower lobe subsegmental atelectasis. Abdomen/pelvis: 1. Findings of fluid overload with small volume ascites, mild periportal edema and mild gallbladder wall edema. 2. Otherwise no evidence of acute process in the abdomen or pelvis. CT ABD PELV W CONT    Result Date: 7/11/2021  Chest: 1. No evidence of pulmonary embolism. 2. Trace bilateral pleural effusions and mild bilateral lower lobe subsegmental atelectasis. Abdomen/pelvis: 1.  Findings of fluid overload with small volume ascites, mild periportal edema and mild gallbladder wall edema. 2. Otherwise no evidence of acute process in the abdomen or pelvis. US ABD LTD    Result Date: 7/11/2021  No findings of acute cholecystitis. Gallbladder wall and periportal edema were better seen on prior CT. XR CHEST PORT    Result Date: 7/10/2021  No acute findings. Laboratory data:  Recent Results (from the past 24 hour(s))   METABOLIC PANEL, BASIC    Collection Time: 07/13/21  3:20 AM   Result Value Ref Range    Sodium 141 136 - 145 mmol/L    Potassium 3.4 (L) 3.5 - 5.1 mmol/L    Chloride 111 (H) 97 - 108 mmol/L    CO2 27 21 - 32 mmol/L    Anion gap 3 (L) 5 - 15 mmol/L    Glucose 81 65 - 100 mg/dL    BUN 4 (L) 6 - 20 MG/DL    Creatinine 0.59 0.55 - 1.02 MG/DL    BUN/Creatinine ratio 7 (L) 12 - 20      GFR est AA >60 >60 ml/min/1.73m2    GFR est non-AA >60 >60 ml/min/1.73m2    Calcium 8.3 (L) 8.5 - 10.1 MG/DL   TROPONIN I    Collection Time: 07/13/21  3:20 AM   Result Value Ref Range    Troponin-I, Qt. 6.00 (H) <0.05 ng/mL   CBC W/O DIFF    Collection Time: 07/13/21  3:20 AM   Result Value Ref Range    WBC 4.4 3.6 - 11.0 K/uL    RBC 3.02 (L) 3.80 - 5.20 M/uL    HGB 9.5 (L) 11.5 - 16.0 g/dL    HCT 28.2 (L) 35.0 - 47.0 %    MCV 93.4 80.0 - 99.0 FL    MCH 31.5 26.0 - 34.0 PG    MCHC 33.7 30.0 - 36.5 g/dL    RDW 11.7 11.5 - 14.5 %    PLATELET 759 490 - 676 K/uL    MPV 10.2 8.9 - 12.9 FL    NRBC 0.0 0  WBC    ABSOLUTE NRBC 0.00 0.00 - 0.01 K/uL           DISCHARGE MEDICATIONS:   See Medication Reconciliation Form    · It is important that you take the medication exactly as they are prescribed. · Keep your medication in the bottles provided by the pharmacist and keep a list of the medication names, dosages, and times to be taken in your wallet. · Do not take other medications without consulting your doctor. NOTIFY YOUR PHYSICIAN FOR ANY OF THE FOLLOWING:   Fever over 101 degrees for 24 hours. Chest pain, shortness of breath, fever, chills, nausea, vomiting, diarrhea, change in mentation, falling, weakness, bleeding. Severe pain or pain not relieved by medications. Or, any other signs or symptoms that you may have questions about. DISPOSITION:  x  Home With:   OT  PT  HH  RN       SNF/Inpatient Rehab/LTAC    Independent/assisted living    Hospice    Other:     CDMP Checked:   Yes x     PROBLEM LIST Updated:  Yes x        My Medications      START taking these medications      Instructions Each Dose to Equal Morning Noon Evening Bedtime   aspirin 81 mg chewable tablet  Start taking on: July 14, 2021    Your last dose was: Your next dose is: Take 1 Tablet by mouth daily. 81 mg                 pantoprazole 40 mg tablet  Commonly known as: PROTONIX  Start taking on: July 14, 2021    Your last dose was: Your next dose is: Take 1 Tablet by mouth Daily (before breakfast). 40 mg                       Where to Get Your Medications      Information on where to get these meds will be given to you by the nurse or doctor.     Ask your nurse or doctor about these medications  · aspirin 81 mg chewable tablet  · pantoprazole 40 mg tablet         Signed:   Solange Garcia MD  7/13/2021  3:10 PM

## 2021-07-13 NOTE — PROGRESS NOTES
Transitions of Care Plan:  RUR: 12%  Clinical Plan: HIDA scan; RUQ US; poss myocarditis workup? Consults: Cardiology; ID  Baseline: independent without DME; resides alone; employed  Disposition: home with family to transport    Reason for Admission:  NSTEMI                   RUR Score:          12%           Plan for utilizing home health:      No    PCP: First and Last name:  None - patient requests hospitals Primary Care   Name of Practice:    Are you a current patient: Yes/No:    Approximate date of last visit:    Can you participate in a virtual visit with your PCP:                     Current Advanced Directive/Advance Care Plan: Full Code      Healthcare Decision Maker: Mother - Minnie Fabry - p: [phone]  Click here to complete [employer] including selection of the Healthcare Decision Maker Relationship (ie \"Primary\")                        Transition of Care Plan:                  CM spoke with patient at bedside re initial assessment and disposition plan:    Baseline Assessment:  1) ADLs, self-care, orientation: independent without DME; AOx4  2) Social: resides home alone; son nearby; employed  3) Pharmacy: [facility] at [address] [place] [address]  4) PCP and Insurance: Patient requests establishment with [facility] sent request to CM Specialist re set up if possible; confirmed insurance    Disposition Plan:  Home once medically stable - family will transport    CM will continue to follow. Joana Banda, MPH  Care Manager [facility]  Available via [facility] or      Care Management Interventions  PCP Verified by CM: Yes (req PCP with hospitals)  Palliative Care Criteria Met (RRAT>21 & CHF Dx)?: No  Mode of Transport at Discharge:  Other (see comment) (Family, private car)  Transition of Care Consult (CM Consult): Discharge Planning  MyChart Signup: No  Discharge Durable Medical Equipment: No  Health Maintenance Reviewed: Yes  Physical Therapy Consult: No  Occupational Therapy Consult: No  Speech Therapy Consult: No  Current Support Network: Lives Alone, Own Home  Confirm Follow Up Transport: Self  Discharge Location  Discharge Placement: Home

## 2021-07-14 ENCOUNTER — TELEPHONE (OUTPATIENT)
Dept: CARDIAC REHAB | Age: 56
End: 2021-07-14

## 2021-07-14 NOTE — TELEPHONE ENCOUNTER
7/14/2021 Cardiac Rehab: Called Ms. Luis Antonio Jimenez  to discuss recent admission. She is aware of elevated cardiac enzymes and need for cardiac MRI. Answered questions about how this will be scheduled and support given. Did not offer cardiac rehab at this time.  Simone Koch RN

## 2021-07-16 PROBLEM — R77.8 ELEVATED TROPONIN: Status: ACTIVE | Noted: 2021-07-16

## 2021-07-16 NOTE — TELEPHONE ENCOUNTER
Called patient and informed her about the cardiac MRI. Pt was informed that she will need to call central scheduling to get that scheduled. Once patient is scheduled for MRI I will schedule for a follow up appointment.

## 2021-09-27 ENCOUNTER — HOSPITAL ENCOUNTER (OUTPATIENT)
Dept: MRI IMAGING | Age: 56
Discharge: HOME | End: 2021-09-27
Attending: INTERNAL MEDICINE
Payer: COMMERCIAL

## 2021-09-27 VITALS — WEIGHT: 160 LBS | BODY MASS INDEX: 24.33 KG/M2

## 2021-09-27 DIAGNOSIS — I21.4 NON-ST ELEVATION MYOCARDIAL INFARCTION (NSTEMI) (HCC): ICD-10-CM

## 2021-09-27 PROCEDURE — 75561 CARDIAC MRI FOR MORPH W/DYE: CPT | Performed by: INTERNAL MEDICINE

## 2021-09-27 PROCEDURE — 75561 CARDIAC MRI FOR MORPH W/DYE: CPT

## 2021-09-27 PROCEDURE — 77030021566

## 2021-09-27 PROCEDURE — A9576 INJ PROHANCE MULTIPACK: HCPCS | Performed by: INTERNAL MEDICINE

## 2021-09-27 PROCEDURE — 74011250636 HC RX REV CODE- 250/636: Performed by: INTERNAL MEDICINE

## 2021-09-27 RX ADMIN — GADOTERIDOL 20 ML: 279.3 INJECTION, SOLUTION INTRAVENOUS at 09:22

## 2021-09-28 ENCOUNTER — TELEPHONE (OUTPATIENT)
Dept: CARDIOLOGY CLINIC | Age: 56
End: 2021-09-28

## 2021-09-28 NOTE — TELEPHONE ENCOUNTER
----- Message from Hipolito Aponte MD sent at 9/27/2021 11:46 PM EDT -----  Inform patient that based on cardiac MRI, likely possibility of 4 abnormal blood work during recent hospitalization would be a possible viral infection related minor damage to the heart. Overall her heart is pretty strong with normal function. The likelihood of this happening again is pretty low.   No further cardiac follow-up is needed

## 2022-03-19 PROBLEM — R19.7 DIARRHEA OF PRESUMED INFECTIOUS ORIGIN: Status: ACTIVE | Noted: 2021-07-11

## 2022-03-19 PROBLEM — R77.8 ELEVATED TROPONIN: Status: ACTIVE | Noted: 2021-07-16

## 2022-03-19 PROBLEM — I21.4 NSTEMI (NON-ST ELEVATED MYOCARDIAL INFARCTION) (HCC): Status: ACTIVE | Noted: 2021-07-10

## (undated) DEVICE — PACK PROCEDURE SURG HRT CATH

## (undated) DEVICE — SPECIAL PROCEDURE DRAPE 32" X 34": Brand: SPECIAL PROCEDURE DRAPE

## (undated) DEVICE — SHEATH RAIN RAD INTRO SHTH W/ BARE WIRE 10 CM 506610S

## (undated) DEVICE — KIT MED IMAG CNTRST AGNT W/ IOPAMIDOL REUSE

## (undated) DEVICE — TR BAND RADIAL ARTERY COMPRESSION DEVICE: Brand: TR BAND

## (undated) DEVICE — CATHETER DIAG AD 4FR L100CM STD NYL JUDKINS R 4 TRULUMEN

## (undated) DEVICE — KIT HND CTRL 3 W STPCOCK ROT END 54IN PREM HI PRSS TBNG AT

## (undated) DEVICE — ANGIOGRAPHY KIT

## (undated) DEVICE — INTRODUCER SHTH THN WALLED 6 FRX10 CM 22 GA ANGLED RAIN SHTH

## (undated) DEVICE — CATHETER ANGIO 4FR L100CM S STL NYL JL3.5 3 SEG BRAID SFT

## (undated) DEVICE — KIT MFLD ISOLATN NACL CNTRST PRT TBNG SPIK W/ PRSS TRNSDUC

## (undated) DEVICE — ANGIOGRAPHIC CATHETER: Brand: IMPULSE™

## (undated) DEVICE — GUIDEWIRE VASC L260CM DIA0.035IN TIP L3MM STD EXCHG PTFE J

## (undated) DEVICE — HI-TORQUE VERSACORE MODIFIED J GUIDE WIRE SYSTEM 145 CM: Brand: HI-TORQUE VERSACORE

## (undated) DEVICE — SPLINT WR POS F/ARTERIAL ACC -- BX/10

## (undated) DEVICE — GLIDESHEATH SLENDER STAINLESS STEEL KIT: Brand: GLIDESHEATH SLENDER